# Patient Record
Sex: FEMALE | Race: WHITE | NOT HISPANIC OR LATINO | URBAN - METROPOLITAN AREA
[De-identification: names, ages, dates, MRNs, and addresses within clinical notes are randomized per-mention and may not be internally consistent; named-entity substitution may affect disease eponyms.]

---

## 2019-03-14 ENCOUNTER — OFFICE VISIT (OUTPATIENT)
Dept: INTERNAL MEDICINE | Age: 29
End: 2019-03-14

## 2019-03-14 VITALS
BODY MASS INDEX: 30.05 KG/M2 | DIASTOLIC BLOOD PRESSURE: 78 MMHG | HEIGHT: 66 IN | WEIGHT: 187 LBS | OXYGEN SATURATION: 98 % | HEART RATE: 104 BPM | TEMPERATURE: 98.7 F | SYSTOLIC BLOOD PRESSURE: 120 MMHG

## 2019-03-14 DIAGNOSIS — R49.0 HOARSENESS: ICD-10-CM

## 2019-03-14 DIAGNOSIS — Z30.011 ENCOUNTER FOR INITIAL PRESCRIPTION OF CONTRACEPTIVE PILLS: ICD-10-CM

## 2019-03-14 DIAGNOSIS — Z00.00 PREVENTATIVE HEALTH CARE: ICD-10-CM

## 2019-03-14 DIAGNOSIS — J35.1 ENLARGED TONSILS: ICD-10-CM

## 2019-03-14 DIAGNOSIS — N94.6 DYSMENORRHEA: Primary | ICD-10-CM

## 2019-03-14 DIAGNOSIS — Z00.00 ROUTINE ADULT HEALTH MAINTENANCE: ICD-10-CM

## 2019-03-14 DIAGNOSIS — Z76.89 ENCOUNTER TO ESTABLISH CARE: ICD-10-CM

## 2019-03-14 DIAGNOSIS — R06.83 SNORING: ICD-10-CM

## 2019-03-14 LAB — HCG UR QL: NEGATIVE

## 2019-03-14 PROCEDURE — 99203 OFFICE O/P NEW LOW 30 MIN: CPT | Performed by: NURSE PRACTITIONER

## 2019-03-14 RX ORDER — CLINDAMYCIN HYDROCHLORIDE 300 MG/1
CAPSULE ORAL
Refills: 0 | COMMUNITY
Start: 2019-03-10 | End: 2020-02-03

## 2019-03-14 RX ORDER — NORGESTIMATE AND ETHINYL ESTRADIOL 0.25-0.035
1 KIT ORAL DAILY
Qty: 28 TABLET | Refills: 11 | Status: SHIPPED | OUTPATIENT
Start: 2019-03-14 | End: 2020-02-02 | Stop reason: SDUPTHER

## 2019-03-14 NOTE — PROGRESS NOTES
[facility] [place] INTERNAL MEDICINE  LATRICIA Hadley Switzer / 29 y.o. / female  03/14/2019      ASSESSMENT & PLAN:    Problem List Items Addressed This Visit     None      Visit Diagnoses     Dysmenorrhea    -  Primary    Relevant Medications    norgestimate-ethinyl estradiol (SPRINTEC 28) 0.25-35 MG-MCG per tablet    Other Relevant Orders    Ambulatory Referral to Gynecology    CBC & Differential    TSH Rfx On Abnormal To Free T4    Pregnancy, Urine - Urine, Clean Catch    Encounter to establish care        Hoarseness        Relevant Orders    Ambulatory Referral to ENT (Otolaryngology)    Enlarged tonsils        Relevant Orders    Ambulatory Referral to ENT (Otolaryngology)    Snoring        Relevant Orders    Ambulatory Referral to ENT (Otolaryngology)    Preventative health care        Relevant Orders    Basic metabolic panel    CBC & Differential    TSH Rfx On Abnormal To Free T4    Vitamin D 25 Hydroxy    Routine adult health maintenance        Relevant Orders    Basic metabolic panel    CBC & Differential    TSH Rfx On Abnormal To Free T4    Vitamin D 25 Hydroxy    Encounter for initial prescription of contraceptive pills        Relevant Orders    Pregnancy, Urine - Urine, Clean Catch        Orders Placed This Encounter   Procedures   • Basic metabolic panel   • TSH Rfx On Abnormal To Free T4   • Vitamin D 25 Hydroxy   • Pregnancy, Urine - Urine, Clean Catch   • Ambulatory Referral to ENT (Otolaryngology)   • Ambulatory Referral to Gynecology   • CBC & Differential     New Medications Ordered This Visit   Medications   • norgestimate-ethinyl estradiol (SPRINTEC 28) 0.25-35 MG-MCG per tablet     Sig: Take 1 tablet by mouth Daily.     Dispense:  28 tablet     Refill:  11       Summary/Discussion:    1. Dysmenorrhea  Patient agreeable to restarting MERLINE at this time for treatment of dysmenorrhea. No contraindications noted, urine HCG to be completed by lab (out of POCT tests in office). We also briefly  "discussed other long term contraceptive options, including IUD and implant, patient not planning to have any more children in the near future. Recommended referral to GYN for regular GYN care and further discussion of other contraceptive methods if she chooses.     - norgestimate-ethinyl estradiol (SPRINTEC 28) 0.25-35 MG-MCG per tablet; Take 1 tablet by mouth Daily.  Dispense: 28 tablet; Refill: 11  - Ambulatory Referral to Gynecology  - CBC & Differential  - TSH Rfx On Abnormal To Free T4  - Pregnancy, Urine - Urine, Clean Catch    2. Encounter to establish care      3. Hoarseness  Patient with enlarged tonsils on exam, although may be residual from current strep treatment. Recommend referral to ENT for further evaluation of recurrent hoarseness, snoring, and enlarged tonsils.     - Ambulatory Referral to ENT (Otolaryngology)    4. Enlarged tonsils    - Ambulatory Referral to ENT (Otolaryngology)    5. Snoring    - Ambulatory Referral to ENT (Otolaryngology)    6. Preventative health care    - Basic metabolic panel  - CBC & Differential  - TSH Rfx On Abnormal To Free T4  - Vitamin D 25 Hydroxy    7. Routine adult health maintenance    - Basic metabolic panel  - CBC & Differential  - TSH Rfx On Abnormal To Free T4  - Vitamin D 25 Hydroxy    8. Encounter for initial prescription of contraceptive pills    - Pregnancy, Urine - Urine, Clean Catch      Return in about 1 year (around 3/14/2020) for Next scheduled follow up.    ____________________________________________________________________    VITALS:    Visit Vitals  /78   Pulse 104   Temp 98.7 °F (37.1 °C)   Ht 167.6 cm (66\")   Wt 84.8 kg (187 lb)   SpO2 98%   BMI 30.18 kg/m²       BP Readings from Last 3 Encounters:   03/14/19 120/78     Wt Readings from Last 3 Encounters:   03/14/19 84.8 kg (187 lb)      Body mass index is 30.18 kg/m².    CC: Main reason(s) for today's visit: Establish Care (new pt to establish care); Contraception (pt would like to discuss " "resuming birth control); and Sore Throat (pt has had scratchy throat x 2 months, w/ new onset snoring; pt was Rx'd Clindamycin)      HPI:    Patient is a 29 y.o. female who is here to establish care. Has not had previous PCP or GYN for about 6 years.       Dysmenorrhea/ Premenstrual Syndrome: Patient complains of menstrual symptoms. Symptoms began several years ago, reports improved with birth of son about 6 years ago but recently restarted in the past few months. Patient describes symptoms of  bloating/fluid retention, menstrual cramping with pain, nausea, increased PMS symptoms and irritability. Symptoms occur with periods, which are usually 28-30 days apart and quite regular. Patient denies decreased libido, depression, dyspareunia and menorrhagia. Evaluation to date includes none. Treatment to date includes Oral contraceptive pills per medication list:(effective). The patient is sexually active. LMP 2/20/19.   She would like to discuss restarting contraceptive therapy. No personal history of HTN, VTE, migraines, stroke.     She would also like to discuss concerns about \"scratchy voice all the time\", increased snoring, unexplained periods of episodic hoarseness. Occasional post nasal drainage. Denies any symptoms of GERD. No sore throat, dysphagia, URI symptoms, rhinitis, or congestion. Has history of recurrent ear infections, unknown history of recurrent strep, currently being treated for acute strep pharyngitis x 5 days. Patient is not a smoker, no secondhand smoke exposure, only occasional ETOH use.     Patient Care Team:  Sulema Washington APRN as PCP - General (Internal Medicine)  ____________________________________________________________________      REVIEW OF SYSTEMS    Review of Systems   Constitutional: Negative for activity change, appetite change, fever and unexpected weight change.   HENT: Positive for ear pain, sore throat and voice change. Negative for congestion, postnasal drip, sinus pressure " and trouble swallowing.    Respiratory: Negative for shortness of breath.    Cardiovascular: Negative for chest pain, palpitations and leg swelling.   Genitourinary: Positive for menstrual problem. Negative for dyspareunia, pelvic pain, vaginal bleeding, vaginal discharge and vaginal pain.       PHYSICAL EXAMINATION    Physical Exam   Constitutional: She is oriented to person, place, and time. Vital signs are normal. She appears well-developed and well-nourished. She is cooperative. She does not appear ill. No distress.   HENT:   Head: Normocephalic and atraumatic.   Right Ear: Hearing, external ear and ear canal normal. No drainage or swelling. Tympanic membrane is scarred. Tympanic membrane is not injected and not erythematous. A middle ear effusion is present.   Left Ear: Hearing, external ear and ear canal normal. No drainage or swelling. Tympanic membrane is scarred. Tympanic membrane is not injected and not erythematous. A middle ear effusion is present.   Nose: Nose normal.   Mouth/Throat: Uvula is midline, oropharynx is clear and moist and mucous membranes are normal. Tonsils are 3+ on the right. Tonsils are 3+ on the left. No tonsillar exudate.   Cardiovascular: Normal rate, regular rhythm, S1 normal, S2 normal and normal heart sounds.   No murmur heard.  Pulmonary/Chest: Effort normal and breath sounds normal. She has no decreased breath sounds. She has no wheezes. She has no rhonchi. She has no rales.   Lymphadenopathy:     She has cervical adenopathy.        Right cervical: Superficial cervical adenopathy present. No deep cervical and no posterior cervical adenopathy present.       Left cervical: Superficial cervical adenopathy present. No deep cervical and no posterior cervical adenopathy present.   Neurological: She is alert and oriented to person, place, and time.   Skin: Skin is warm, dry and intact.   Psychiatric: She has a normal mood and affect. Her speech is normal and behavior is normal.  Judgment and thought content normal. Cognition and memory are normal.   Nursing note and vitals reviewed.        REVIEWED DATA:    Labs:   No results found for: NA, K, AST, ALT, BUN, CREATININE, EGFRIFNONA, EGFRIFAFRI    No results found for: GLUCOSE, HGBA1C, MICROALBUR    No results found for: LDL, HDL, TRIG, CHOLHDLRATIO    No results found for: TSH, FREET4     No results found for: WBC, HGB, PLT      Imaging:        Medical Tests:        Summary of old records / correspondence / consultant report:        Request outside records:        ______________________________________________________________________    ALLERGIES  Allergies   Allergen Reactions   • Amoxicillin Rash   • Penicillins Rash        MEDICATIONS  No current outpatient medications on file prior to visit.     No current facility-administered medications on file prior to visit.        PFSH:     The following portions of the patient's history were reviewed and updated as appropriate: Allergies / Current Medications / Past Medical History / Surgical History / Social History / Family History    PROBLEM LIST   There is no problem list on file for this patient.      PAST MEDICAL HISTORY  Past Medical History:   Diagnosis Date   • Dysmenorrhea        SURGICAL HISTORY  Past Surgical History:   Procedure Laterality Date   • EAR TUBES         SOCIAL HISTORY  Social History     Socioeconomic History   • Marital status:      Spouse name: Not on file   • Number of children: 1   • Years of education: Not on file   • Highest education level: Not on file   Occupational History   • Occupation:  worker   Tobacco Use   • Smoking status: Never Smoker   • Smokeless tobacco: Never Used   Substance and Sexual Activity   • Alcohol use: Yes     Comment: occasionally   • Sexual activity: Yes   Social History Narrative    1 child 6 years old        FAMILY HISTORY  Family History   Problem Relation Age of Onset   • No Known Problems Mother    • Heart disease Father     • Heart attack Father    • Heart disease Maternal Grandfather    • Cancer Maternal Grandfather    • Pancreatic cancer Maternal Grandfather          **Avni Disclaimer:   Much of this encounter note is an electronic transcription/translation of spoken language to printed text. The electronic translation of spoken language may permit erroneous, or at times, nonsensical words or phrases to be inadvertently transcribed. Although I have reviewed the note for such errors, some may still exist.

## 2019-03-15 DIAGNOSIS — E55.9 VITAMIN D DEFICIENCY: Primary | ICD-10-CM

## 2019-03-15 LAB
25(OH)D3+25(OH)D2 SERPL-MCNC: 18.5 NG/ML (ref 30–100)
BASOPHILS # BLD AUTO: 0.03 10*3/MM3 (ref 0–0.2)
BASOPHILS NFR BLD AUTO: 0.4 % (ref 0–1.5)
BUN SERPL-MCNC: 7 MG/DL (ref 6–20)
BUN/CREAT SERPL: 11.3 (ref 7–25)
CALCIUM SERPL-MCNC: 9.5 MG/DL (ref 8.6–10.5)
CHLORIDE SERPL-SCNC: 100 MMOL/L (ref 98–107)
CO2 SERPL-SCNC: 25.5 MMOL/L (ref 22–29)
CREAT SERPL-MCNC: 0.62 MG/DL (ref 0.57–1)
EOSINOPHIL # BLD AUTO: 0.03 10*3/MM3 (ref 0–0.4)
EOSINOPHIL NFR BLD AUTO: 0.4 % (ref 0.3–6.2)
ERYTHROCYTE [DISTWIDTH] IN BLOOD BY AUTOMATED COUNT: 13.4 % (ref 12.3–15.4)
GLUCOSE SERPL-MCNC: 86 MG/DL (ref 65–99)
HCT VFR BLD AUTO: 43.2 % (ref 34–46.6)
HGB BLD-MCNC: 13.9 G/DL (ref 12–15.9)
IMM GRANULOCYTES # BLD AUTO: 0.02 10*3/MM3 (ref 0–0.05)
IMM GRANULOCYTES NFR BLD AUTO: 0.3 % (ref 0–0.5)
LYMPHOCYTES # BLD AUTO: 2.58 10*3/MM3 (ref 0.7–3.1)
LYMPHOCYTES NFR BLD AUTO: 33.9 % (ref 19.6–45.3)
MCH RBC QN AUTO: 27.5 PG (ref 26.6–33)
MCHC RBC AUTO-ENTMCNC: 32.2 G/DL (ref 31.5–35.7)
MCV RBC AUTO: 85.5 FL (ref 79–97)
MONOCYTES # BLD AUTO: 0.67 10*3/MM3 (ref 0.1–0.9)
MONOCYTES NFR BLD AUTO: 8.8 % (ref 5–12)
NEUTROPHILS # BLD AUTO: 4.28 10*3/MM3 (ref 1.4–7)
NEUTROPHILS NFR BLD AUTO: 56.2 % (ref 42.7–76)
NRBC BLD AUTO-RTO: 0.1 /100 WBC (ref 0–0)
PLATELET # BLD AUTO: 300 10*3/MM3 (ref 140–450)
POTASSIUM SERPL-SCNC: 3.9 MMOL/L (ref 3.5–5.2)
RBC # BLD AUTO: 5.05 10*6/MM3 (ref 3.77–5.28)
SODIUM SERPL-SCNC: 138 MMOL/L (ref 136–145)
TSH SERPL DL<=0.005 MIU/L-ACNC: 2.35 MIU/ML (ref 0.27–4.2)
WBC # BLD AUTO: 7.61 10*3/MM3 (ref 3.4–10.8)

## 2019-08-14 ENCOUNTER — OFFICE VISIT (OUTPATIENT)
Dept: INTERNAL MEDICINE | Age: 29
End: 2019-08-14

## 2019-08-14 VITALS
TEMPERATURE: 99.3 F | BODY MASS INDEX: 31.53 KG/M2 | OXYGEN SATURATION: 99 % | HEIGHT: 66 IN | WEIGHT: 196.2 LBS | HEART RATE: 79 BPM

## 2019-08-14 DIAGNOSIS — J02.9 PHARYNGITIS, UNSPECIFIED ETIOLOGY: Primary | ICD-10-CM

## 2019-08-14 PROCEDURE — 99213 OFFICE O/P EST LOW 20 MIN: CPT | Performed by: INTERNAL MEDICINE

## 2019-08-14 RX ORDER — AZITHROMYCIN 250 MG/1
TABLET, FILM COATED ORAL
Qty: 6 TABLET | Refills: 0 | Status: SHIPPED | OUTPATIENT
Start: 2019-08-14 | End: 2020-02-03

## 2019-08-14 NOTE — PROGRESS NOTES
Leonie Gomez is a 29 y.o. female who presents with   Chief Complaint   Patient presents with   • Sore Throat     4 days; temperatures of around 100.  No drainage, cough or exudates   .    29-year-old female.  Patient of nurse practitioner Felix.  Presents with a 4-day history of right ear pain, sore throat and swollen glands in the right side of her neck.  She has had a temperature of around 100.  She works in a  center.  She has not knowingly been around anyone with strep or mono she says.      Sore Throat    This is a new problem. The current episode started in the past 7 days. The problem has been unchanged. The pain is worse on the right side. The maximum temperature recorded prior to her arrival was 100.4 - 100.9 F. The pain is mild. Associated symptoms include ear pain and swollen glands. She has had no exposure to strep or mono. She has tried nothing for the symptoms.        The following portions of the patient's history were reviewed and updated as appropriate: allergies, current medications, past medical history and problem list.    Review of Systems   Constitutional: Negative.    HENT: Positive for ear pain and sore throat.    Respiratory: Negative.    Cardiovascular: Negative.    Neurological: Negative.    Psychiatric/Behavioral: Negative.        Objective   Physical Exam   Constitutional: She is oriented to person, place, and time. She appears well-developed and well-nourished.   HENT:   Head: Normocephalic and atraumatic.   Both tympanic membranes appear reddened.  Oropharynx reddened posteriorly but no exudate seen.  Tender right submandibular adenopathy is noted.  Lungs are clear.   Eyes: EOM are normal. Pupils are equal, round, and reactive to light.   Cardiovascular: Normal rate.   Pulmonary/Chest: Effort normal.   Neurological: She is alert and oriented to person, place, and time.   Psychiatric: She has a normal mood and affect. Her behavior is normal. Judgment and thought content  normal.   Nursing note and vitals reviewed.      Assessment/Plan   Leonie was seen today for sore throat.    Diagnoses and all orders for this visit:    Pharyngitis, unspecified etiology    Other orders  -     azithromycin (ZITHROMAX Z-ISABEL) 250 MG tablet; Take 2 tablets the first day, then 1 tablet daily for 4 days.        Plan: Z-Isabel as directed.  Follow-up as needed.  Symptomatic treatment with Tylenol or Advil for pain or temperature.  Saline gargles, Chloraseptic Spray etc.

## 2020-02-02 DIAGNOSIS — N94.6 DYSMENORRHEA: ICD-10-CM

## 2020-02-03 RX ORDER — NORGESTIMATE AND ETHINYL ESTRADIOL 0.25-0.035
1 KIT ORAL DAILY
Qty: 28 TABLET | Refills: 11 | Status: SHIPPED | OUTPATIENT
Start: 2020-02-03 | End: 2021-01-08 | Stop reason: SDUPTHER

## 2020-10-16 ENCOUNTER — OFFICE VISIT (OUTPATIENT)
Dept: INTERNAL MEDICINE | Age: 30
End: 2020-10-16

## 2020-10-16 VITALS
OXYGEN SATURATION: 99 % | WEIGHT: 157.4 LBS | RESPIRATION RATE: 13 BRPM | HEIGHT: 66 IN | HEART RATE: 81 BPM | BODY MASS INDEX: 25.3 KG/M2 | SYSTOLIC BLOOD PRESSURE: 110 MMHG | DIASTOLIC BLOOD PRESSURE: 70 MMHG | TEMPERATURE: 97.6 F

## 2020-10-16 DIAGNOSIS — R56.9 SEIZURE (HCC): Primary | ICD-10-CM

## 2020-10-16 PROCEDURE — 99214 OFFICE O/P EST MOD 30 MIN: CPT | Performed by: INTERNAL MEDICINE

## 2020-10-16 RX ORDER — LANOLIN ALCOHOL/MO/W.PET/CERES
400 CREAM (GRAM) TOPICAL DAILY
COMMUNITY
End: 2023-02-09

## 2020-10-16 RX ORDER — LEVETIRACETAM 500 MG/1
500 TABLET ORAL 2 TIMES DAILY
COMMUNITY
End: 2021-06-15
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
## 2021-01-08 DIAGNOSIS — N94.6 DYSMENORRHEA: ICD-10-CM

## 2021-01-08 RX ORDER — NORGESTIMATE AND ETHINYL ESTRADIOL 0.25-0.035
1 KIT ORAL DAILY
Qty: 28 TABLET | Refills: 11 | Status: SHIPPED | OUTPATIENT
Start: 2021-01-08 | End: 2021-12-17

## 2021-02-01 DIAGNOSIS — Z00.00 PREVENTATIVE HEALTH CARE: Primary | ICD-10-CM

## 2021-02-01 DIAGNOSIS — Z11.59 NEED FOR HEPATITIS C SCREENING TEST: ICD-10-CM

## 2021-02-01 DIAGNOSIS — E55.9 VITAMIN D DEFICIENCY: ICD-10-CM

## 2021-02-06 LAB
25(OH)D3+25(OH)D2 SERPL-MCNC: 49.2 NG/ML (ref 30–100)
ALBUMIN SERPL-MCNC: 4.1 G/DL (ref 3.5–5.2)
ALBUMIN/GLOB SERPL: 1.4 G/DL
ALP SERPL-CCNC: 54 U/L (ref 39–117)
ALT SERPL-CCNC: 13 U/L (ref 1–33)
APPEARANCE UR: CLEAR
AST SERPL-CCNC: 20 U/L (ref 1–32)
BASOPHILS # BLD AUTO: 0.04 10*3/MM3 (ref 0–0.2)
BASOPHILS NFR BLD AUTO: 0.7 % (ref 0–1.5)
BILIRUB SERPL-MCNC: 0.4 MG/DL (ref 0–1.2)
BILIRUB UR QL STRIP: NEGATIVE
BUN SERPL-MCNC: 8 MG/DL (ref 6–20)
BUN/CREAT SERPL: 9.9 (ref 7–25)
CALCIUM SERPL-MCNC: 9.1 MG/DL (ref 8.6–10.5)
CHLORIDE SERPL-SCNC: 104 MMOL/L (ref 98–107)
CHOLEST SERPL-MCNC: 226 MG/DL (ref 0–200)
CHOLEST/HDLC SERPL: 3.83 {RATIO}
CO2 SERPL-SCNC: 22.4 MMOL/L (ref 22–29)
COLOR UR: YELLOW
CREAT SERPL-MCNC: 0.81 MG/DL (ref 0.57–1)
EOSINOPHIL # BLD AUTO: 0.07 10*3/MM3 (ref 0–0.4)
EOSINOPHIL NFR BLD AUTO: 1.2 % (ref 0.3–6.2)
ERYTHROCYTE [DISTWIDTH] IN BLOOD BY AUTOMATED COUNT: 12 % (ref 12.3–15.4)
GLOBULIN SER CALC-MCNC: 2.9 GM/DL
GLUCOSE SERPL-MCNC: 87 MG/DL (ref 65–99)
GLUCOSE UR QL: NEGATIVE
HCT VFR BLD AUTO: 39.3 % (ref 34–46.6)
HCV AB S/CO SERPL IA: <0.1 S/CO RATIO (ref 0–0.9)
HDLC SERPL-MCNC: 59 MG/DL (ref 40–60)
HGB BLD-MCNC: 13 G/DL (ref 12–15.9)
HGB UR QL STRIP: NEGATIVE
IMM GRANULOCYTES # BLD AUTO: 0.02 10*3/MM3 (ref 0–0.05)
IMM GRANULOCYTES NFR BLD AUTO: 0.3 % (ref 0–0.5)
KETONES UR QL STRIP: NEGATIVE
LDLC SERPL CALC-MCNC: 130 MG/DL (ref 0–100)
LEUKOCYTE ESTERASE UR QL STRIP: NEGATIVE
LYMPHOCYTES # BLD AUTO: 2.42 10*3/MM3 (ref 0.7–3.1)
LYMPHOCYTES NFR BLD AUTO: 40.7 % (ref 19.6–45.3)
MCH RBC QN AUTO: 28 PG (ref 26.6–33)
MCHC RBC AUTO-ENTMCNC: 33.1 G/DL (ref 31.5–35.7)
MCV RBC AUTO: 84.5 FL (ref 79–97)
MONOCYTES # BLD AUTO: 0.52 10*3/MM3 (ref 0.1–0.9)
MONOCYTES NFR BLD AUTO: 8.8 % (ref 5–12)
NEUTROPHILS # BLD AUTO: 2.87 10*3/MM3 (ref 1.7–7)
NEUTROPHILS NFR BLD AUTO: 48.3 % (ref 42.7–76)
NITRITE UR QL STRIP: NEGATIVE
NRBC BLD AUTO-RTO: 0 /100 WBC (ref 0–0.2)
PH UR STRIP: 5.5 [PH] (ref 5–8)
PLATELET # BLD AUTO: 310 10*3/MM3 (ref 140–450)
POTASSIUM SERPL-SCNC: 4.8 MMOL/L (ref 3.5–5.2)
PROT SERPL-MCNC: 7 G/DL (ref 6–8.5)
PROT UR QL STRIP: NEGATIVE
RBC # BLD AUTO: 4.65 10*6/MM3 (ref 3.77–5.28)
SODIUM SERPL-SCNC: 140 MMOL/L (ref 136–145)
SP GR UR: 1.02 (ref 1–1.03)
TRIGL SERPL-MCNC: 213 MG/DL (ref 0–150)
TSH SERPL DL<=0.005 MIU/L-ACNC: 4.05 UIU/ML (ref 0.27–4.2)
UROBILINOGEN UR STRIP-MCNC: NORMAL MG/DL
VLDLC SERPL CALC-MCNC: 37 MG/DL (ref 5–40)
WBC # BLD AUTO: 5.94 10*3/MM3 (ref 3.4–10.8)

## 2021-04-16 ENCOUNTER — BULK ORDERING (OUTPATIENT)
Dept: CASE MANAGEMENT | Facility: OTHER | Age: 31
End: 2021-04-16

## 2021-04-16 DIAGNOSIS — Z23 IMMUNIZATION DUE: ICD-10-CM

## 2021-05-04 ENCOUNTER — OFFICE VISIT (OUTPATIENT)
Dept: INTERNAL MEDICINE | Age: 31
End: 2021-05-04

## 2021-05-04 VITALS
OXYGEN SATURATION: 100 % | SYSTOLIC BLOOD PRESSURE: 118 MMHG | TEMPERATURE: 98.4 F | HEIGHT: 66 IN | DIASTOLIC BLOOD PRESSURE: 76 MMHG | BODY MASS INDEX: 26.68 KG/M2 | HEART RATE: 90 BPM | WEIGHT: 166 LBS

## 2021-05-04 DIAGNOSIS — R56.9 SEIZURE (HCC): ICD-10-CM

## 2021-05-04 DIAGNOSIS — F41.9 ANXIETY AND DEPRESSION: ICD-10-CM

## 2021-05-04 DIAGNOSIS — Z00.00 ANNUAL PHYSICAL EXAM: Primary | ICD-10-CM

## 2021-05-04 DIAGNOSIS — F32.A ANXIETY AND DEPRESSION: ICD-10-CM

## 2021-05-04 DIAGNOSIS — Z00.00 ROUTINE HEALTH MAINTENANCE: ICD-10-CM

## 2021-05-04 PROCEDURE — 99214 OFFICE O/P EST MOD 30 MIN: CPT | Performed by: NURSE PRACTITIONER

## 2021-05-04 PROCEDURE — 99395 PREV VISIT EST AGE 18-39: CPT | Performed by: NURSE PRACTITIONER

## 2021-05-04 NOTE — PATIENT INSTRUCTIONS
Health Maintenance, Female  Adopting a healthy lifestyle and getting preventive care are important in promoting health and wellness. Ask your health care provider about:  · The right schedule for you to have regular tests and exams.  · Things you can do on your own to prevent diseases and keep yourself healthy.  What should I know about diet, weight, and exercise?  Eat a healthy diet    · Eat a diet that includes plenty of vegetables, fruits, low-fat dairy products, and lean protein.  · Do not eat a lot of foods that are high in solid fats, added sugars, or sodium.  Maintain a healthy weight  Body mass index (BMI) is used to identify weight problems. It estimates body fat based on height and weight. Your health care provider can help determine your BMI and help you achieve or maintain a healthy weight.  Get regular exercise  Get regular exercise. This is one of the most important things you can do for your health. Most adults should:  · Exercise for at least 150 minutes each week. The exercise should increase your heart rate and make you sweat (moderate-intensity exercise).  · Do strengthening exercises at least twice a week. This is in addition to the moderate-intensity exercise.  · Spend less time sitting. Even light physical activity can be beneficial.  Watch cholesterol and blood lipids  Have your blood tested for lipids and cholesterol at 20 years of age, then have this test every 5 years.  Have your cholesterol levels checked more often if:  · Your lipid or cholesterol levels are high.  · You are older than 40 years of age.  · You are at high risk for heart disease.  What should I know about cancer screening?  Depending on your health history and family history, you may need to have cancer screening at various ages. This may include screening for:  · Breast cancer.  · Cervical cancer.  · Colorectal cancer.  · Skin cancer.  · Lung cancer.  What should I know about heart disease, diabetes, and high blood  pressure?  Blood pressure and heart disease  · High blood pressure causes heart disease and increases the risk of stroke. This is more likely to develop in people who have high blood pressure readings, are of  descent, or are overweight.  · Have your blood pressure checked:  ? Every 3-5 years if you are 18-39 years of age.  ? Every year if you are 40 years old or older.  Diabetes  Have regular diabetes screenings. This checks your fasting blood sugar level. Have the screening done:  · Once every three years after age 40 if you are at a normal weight and have a low risk for diabetes.  · More often and at a younger age if you are overweight or have a high risk for diabetes.  What should I know about preventing infection?  Hepatitis B  If you have a higher risk for hepatitis B, you should be screened for this virus. Talk with your health care provider to find out if you are at risk for hepatitis B infection.  Hepatitis C  Testing is recommended for:  · Everyone born from 1945 through 1965.  · Anyone with known risk factors for hepatitis C.  Sexually transmitted infections (STIs)  · Get screened for STIs, including gonorrhea and chlamydia, if:  ? You are sexually active and are younger than 24 years of age.  ? You are older than 24 years of age and your health care provider tells you that you are at risk for this type of infection.  ? Your sexual activity has changed since you were last screened, and you are at increased risk for chlamydia or gonorrhea. Ask your health care provider if you are at risk.  · Ask your health care provider about whether you are at high risk for HIV. Your health care provider may recommend a prescription medicine to help prevent HIV infection. If you choose to take medicine to prevent HIV, you should first get tested for HIV. You should then be tested every 3 months for as long as you are taking the medicine.  Pregnancy  · If you are about to stop having your period (premenopausal) and  you may become pregnant, seek counseling before you get pregnant.  · Take 400 to 800 micrograms (mcg) of folic acid every day if you become pregnant.  · Ask for birth control (contraception) if you want to prevent pregnancy.  Osteoporosis and menopause  Osteoporosis is a disease in which the bones lose minerals and strength with aging. This can result in bone fractures. If you are 65 years old or older, or if you are at risk for osteoporosis and fractures, ask your health care provider if you should:  · Be screened for bone loss.  · Take a calcium or vitamin D supplement to lower your risk of fractures.  · Be given hormone replacement therapy (HRT) to treat symptoms of menopause.  Follow these instructions at home:  Lifestyle  · Do not use any products that contain nicotine or tobacco, such as cigarettes, e-cigarettes, and chewing tobacco. If you need help quitting, ask your health care provider.  · Do not use street drugs.  · Do not share needles.  · Ask your health care provider for help if you need support or information about quitting drugs.  Alcohol use  · Do not drink alcohol if:  ? Your health care provider tells you not to drink.  ? You are pregnant, may be pregnant, or are planning to become pregnant.  · If you drink alcohol:  ? Limit how much you use to 0-1 drink a day.  ? Limit intake if you are breastfeeding.  · Be aware of how much alcohol is in your drink. In the [place] one drink equals one 12 oz bottle of beer (355 mL), one 5 oz glass of wine (148 mL), or one 1½ oz glass of hard liquor (44 mL).  General instructions  · Schedule regular health, dental, and eye exams.  · Stay current with your vaccines.  · Tell your health care provider if:  ? You often feel depressed.  ? You have ever been abused or do not feel safe at home.  Sertraline tablets  What is this medicine?  SERTRALINE (SER tra rossana) is used to treat depression. It may also be used to treat obsessive compulsive disorder, panic disorder,  post-trauma stress, premenstrual dysphoric disorder (PMDD) or social anxiety.  This medicine may be used for other purposes; ask your health care provider or pharmacist if you have questions.  COMMON BRAND NAME(S): Zoloft  What should I tell my health care provider before I take this medicine?  They need to know if you have any of these conditions:  · bleeding disorders  · bipolar disorder or a family history of bipolar disorder  · glaucoma  · heart disease  · high blood pressure  · history of irregular heartbeat  · history of low levels of calcium, magnesium, or potassium in the blood  · if you often drink alcohol  · liver disease  · receiving electroconvulsive therapy  · seizures  · suicidal thoughts, plans, or attempt; a previous suicide attempt by you or a family member  · take medicines that treat or prevent blood clots  · thyroid disease  · an unusual or allergic reaction to sertraline, other medicines, foods, dyes, or preservatives  · pregnant or trying to get pregnant  · breast-feeding  How should I use this medicine?  Take this medicine by mouth with a glass of water. Follow the directions on the prescription label. You can take it with or without food. Take your medicine at regular intervals. Do not take your medicine more often than directed. Do not stop taking this medicine suddenly except upon the advice of your doctor. Stopping this medicine too quickly may cause serious side effects or your condition may worsen.  A special MedGuide will be given to you by the pharmacist with each prescription and refill. Be sure to read this information carefully each time.  Talk to your pediatrician regarding the use of this medicine in children. While this drug may be prescribed for children as young as 7 years for selected conditions, precautions do apply.  Overdosage: If you think you have taken too much of this medicine contact a poison control center or emergency room at once.  NOTE: This medicine is only for  you. Do not share this medicine with others.  What if I miss a dose?  If you miss a dose, take it as soon as you can. If it is almost time for your next dose, take only that dose. Do not take double or extra doses.  What may interact with this medicine?  Do not take this medicine with any of the following medications:  · cisapride  · dronedarone  · linezolid  · MAOIs like Carbex, Eldepryl, Marplan, Nardil, and Parnate  · methylene blue (injected into a vein)  · pimozide  · thioridazine  This medicine may also interact with the following medications:  · alcohol  · amphetamines  · aspirin and aspirin-like medicines  · certain medicines for depression, anxiety, or psychotic disturbances  · certain medicines for fungal infections like ketoconazole, fluconazole, posaconazole, and itraconazole  · certain medicines for irregular heart beat like flecainide, quinidine, propafenone  · certain medicines for migraine headaches like almotriptan, eletriptan, frovatriptan, naratriptan, rizatriptan, sumatriptan, zolmitriptan  · certain medicines for sleep  · certain medicines for seizures like carbamazepine, valproic acid, phenytoin  · certain medicines that treat or prevent blood clots like warfarin, enoxaparin, dalteparin  · cimetidine  · digoxin  · diuretics  · fentanyl  · isoniazid  · lithium  · NSAIDs, medicines for pain and inflammation, like ibuprofen or naproxen  · other medicines that prolong the QT interval (cause an abnormal heart rhythm) like dofetilide  · rasagiline  · safinamide  · supplements like Pooja's wort, kava kava, valerian  · tolbutamide  · tramadol  · tryptophan  This list may not describe all possible interactions. Give your health care provider a list of all the medicines, herbs, non-prescription drugs, or dietary supplements you use. Also tell them if you smoke, drink alcohol, or use illegal drugs. Some items may interact with your medicine.  What should I watch for while using this medicine?  Tell  your doctor if your symptoms do not get better or if they get worse. Visit your doctor or health care professional for regular checks on your progress. Because it may take several weeks to see the full effects of this medicine, it is important to continue your treatment as prescribed by your doctor.  Patients and their families should watch out for new or worsening thoughts of suicide or depression. Also watch out for sudden changes in feelings such as feeling anxious, agitated, panicky, irritable, hostile, aggressive, impulsive, severely restless, overly excited and hyperactive, or not being able to sleep. If this happens, especially at the beginning of treatment or after a change in dose, call your health care professional.  You may get drowsy or dizzy. Do not drive, use machinery, or do anything that needs mental alertness until you know how this medicine affects you. Do not stand or sit up quickly, especially if you are an older patient. This reduces the risk of dizzy or fainting spells. Alcohol may interfere with the effect of this medicine. Avoid alcoholic drinks.  Your mouth may get dry. Chewing sugarless gum or sucking hard candy, and drinking plenty of water may help. Contact your doctor if the problem does not go away or is severe.  What side effects may I notice from receiving this medicine?  Side effects that you should report to your doctor or health care professional as soon as possible:  · allergic reactions like skin rash, itching or hives, swelling of the face, lips, or tongue  · anxious  · black, tarry stools  · changes in vision  · confusion  · elevated mood, decreased need for sleep, racing thoughts, impulsive behavior  · eye pain  · fast, irregular heartbeat  · feeling faint or lightheaded, falls  · feeling agitated, angry, or irritable  · hallucination, loss of contact with reality  · loss of balance or coordination  · loss of memory  · painful or prolonged erections  · restlessness, pacing,  inability to keep still  · seizures  · stiff muscles  · suicidal thoughts or other mood changes  · trouble sleeping  · unusual bleeding or bruising  · unusually weak or tired  · vomiting  Side effects that usually do not require medical attention (report to your doctor or health care professional if they continue or are bothersome):  · change in appetite or weight  · change in sex drive or performance  · diarrhea  · increased sweating  · indigestion, nausea  · tremors  This list may not describe all possible side effects. Call your doctor for medical advice about side effects. You may report side effects to FDA at 3-963-FCI-6386.  Where should I keep my medicine?  Keep out of the reach of children.  Store at room temperature between 15 and 30 degrees C (59 and 86 degrees F). Throw away any unused medicine after the expiration date.  NOTE: This sheet is a summary. It may not cover all possible information. If you have questions about this medicine, talk to your doctor, pharmacist, or health care provider.  © 2021 [employer] (2019-12-10 10:09:27)  Summary  · Adopting a healthy lifestyle and getting preventive care are important in promoting health and wellness.  · Follow your health care provider's instructions about healthy diet, exercising, and getting tested or screened for diseases.  · Follow your health care provider's instructions on monitoring your cholesterol and blood pressure.  This information is not intended to replace advice given to you by your health care provider. Make sure you discuss any questions you have with your health care provider.  Document Revised: 12/11/2019 Document Reviewed: 12/11/2019  [employer] Patient Education © 2021 [employer]
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
## 2021-06-15 ENCOUNTER — OFFICE VISIT (OUTPATIENT)
Dept: INTERNAL MEDICINE | Age: 31
End: 2021-06-15

## 2021-06-15 VITALS
WEIGHT: 165 LBS | TEMPERATURE: 97.7 F | OXYGEN SATURATION: 99 % | SYSTOLIC BLOOD PRESSURE: 122 MMHG | BODY MASS INDEX: 26.52 KG/M2 | HEART RATE: 89 BPM | DIASTOLIC BLOOD PRESSURE: 74 MMHG | HEIGHT: 66 IN

## 2021-06-15 DIAGNOSIS — F32.A ANXIETY AND DEPRESSION: ICD-10-CM

## 2021-06-15 DIAGNOSIS — F41.9 ANXIETY AND DEPRESSION: ICD-10-CM

## 2021-06-15 PROCEDURE — 99213 OFFICE O/P EST LOW 20 MIN: CPT | Performed by: NURSE PRACTITIONER

## 2021-06-15 NOTE — PROGRESS NOTES
"Chief Complaint  Anxiety    Subjective          Leonie Gomez presents to [facility]  Anxiety  Presents for follow-up visit. Patient reports no compulsions, depressed mood, excessive worry, nervous/anxious behavior or panic. Primary symptoms comment: Reports significant improvement in situational induced anxiety, reports no longer having panic attacks when faced with triggers (ie. driving downtown, MD appts, etc). . The quality of sleep is good.     Compliance with medications is %. Treatment side effects: Denies any adverse SE.       Objective   Vital Signs:   /74   Pulse 89   Temp 97.7 °F (36.5 °C) (Temporal)   Ht 167.6 cm (65.98\")   Wt 74.8 kg (165 lb)   SpO2 99%   BMI 26.64 kg/m²     Physical Exam  Vitals and nursing note reviewed.   Constitutional:       Appearance: She is well-developed.   Neurological:      Mental Status: She is alert and oriented to person, place, and time. She is not disoriented.   Psychiatric:         Attention and Perception: She is attentive.         Speech: Speech normal.         Behavior: Behavior normal. Behavior is cooperative.         Thought Content: Thought content normal.         Judgment: Judgment normal.        Result Review :   The following data was reviewed by: ALONA Lira on 06/15/2021:  Common labs    Common Labsle 2/5/21 2/5/21 2/5/21    0800 0800 0800   Glucose  87    BUN  8    Creatinine  0.81    eGFR Non  Am  82    eGFR African Am  100    Sodium  140    Potassium  4.8    Chloride  104    Calcium  9.1    Total Protein  7.0    Albumin  4.10    Total Bilirubin  0.4    Alkaline Phosphatase  54    AST (SGOT)  20    ALT (SGPT)  13    WBC 5.94     Hemoglobin 13.0     Hematocrit 39.3     Platelets 310     Total Cholesterol   226 (A)   Triglycerides   213 (A)   HDL Cholesterol   59   LDL Cholesterol    130 (A)   (A) Abnormal value       Comments are available for some flowsheets but are not being displayed. "                  Assessment and Plan    Diagnoses and all orders for this visit:    1. Anxiety and depression  Patient seems to have significantly improved symptoms on current dose.   Continue same, may adjust dosage as needed.   Follow up 6 months for re-evaluation, sooner as needed.     -     sertraline (Zoloft) 50 MG tablet; Take 1 tablet by mouth Daily.  Dispense: 90 tablet; Refill: 1        Follow Up   Return in about 6 months (around 12/15/2021) for Next scheduled follow up.  Patient was given instructions and counseling regarding her condition or for health maintenance advice. Please see specific information pulled into the AVS if appropriate.

## 2021-06-22 ENCOUNTER — TELEPHONE (OUTPATIENT)
Dept: INTERNAL MEDICINE | Age: 31
End: 2021-06-22

## 2021-06-22 NOTE — TELEPHONE ENCOUNTER
Contact patient.     Unfortunately, all SSRIs have the potential to cause nosebleeds. If the nosebleeds are severe, she will need to wean herself off the sertraline.     Recommend can wean herself off of sertraline.   Take every other day for 1 week, then every third day for 1 week, then stop.     Have her follow up with me in 3-4 weeks or sooner if needed.

## 2021-06-22 NOTE — TELEPHONE ENCOUNTER
Caller: Leonie Gomez    Relationship: Self    Best call back number: [phone]    What medications are you currently taking:   Current Outpatient Medications on File Prior to Visit   Medication Sig Dispense Refill   • folic acid (FOLVITE) 400 MCG tablet Take 400 mcg by mouth Daily.     • Multiple Vitamins-Minerals (Hair Skin and Nails Formula) tablet Take 1 tablet by mouth.     • Multiple Vitamins-Minerals (MULTIVITAMIN ADULT PO) Take  by mouth.     • norgestimate-ethinyl estradiol (Sprintec 28) 0.25-35 MG-MCG per tablet Take 1 tablet by mouth Daily. 28 tablet 11   • sertraline (Zoloft) 50 MG tablet Take 1 tablet by mouth Daily. 90 tablet 1     No current facility-administered medications on file prior to visit.        When did you start taking these medications: 7-8 WEEKS AGO    Which medication are you concerned about: sertraline (Zoloft) 50 MG tablet    Who prescribed you this medication: ALONA LEMON    What are your concerns: PATIENT HAS BEEN HAVING NOSE BLEEDS DAILY SINCE Sunday. PATIENT STATED ONE OF THE SIDE EFFECTS OF ZOLOFT IS NOSE BLEED. PATIENT IS ALSO ON VACATION.    How long have you been taking these medications: 7-8 WEEKS AGO    How long have you had these concerns: SINCE SUNDAY

## 2021-07-13 ENCOUNTER — OFFICE VISIT (OUTPATIENT)
Dept: INTERNAL MEDICINE | Age: 31
End: 2021-07-13

## 2021-07-13 VITALS
OXYGEN SATURATION: 99 % | SYSTOLIC BLOOD PRESSURE: 118 MMHG | BODY MASS INDEX: 28.12 KG/M2 | TEMPERATURE: 97.5 F | DIASTOLIC BLOOD PRESSURE: 76 MMHG | HEART RATE: 100 BPM | HEIGHT: 66 IN | WEIGHT: 175 LBS

## 2021-07-13 DIAGNOSIS — F32.A ANXIETY AND DEPRESSION: ICD-10-CM

## 2021-07-13 DIAGNOSIS — F41.9 ANXIETY AND DEPRESSION: ICD-10-CM

## 2021-07-13 DIAGNOSIS — R04.0 EPISTAXIS: Primary | ICD-10-CM

## 2021-07-13 PROCEDURE — 99214 OFFICE O/P EST MOD 30 MIN: CPT | Performed by: NURSE PRACTITIONER

## 2021-07-13 NOTE — PATIENT INSTRUCTIONS
Nosebleed, Adult    A nosebleed is when blood comes out of the nose. Nosebleeds are common. Usually, they are not a sign of a serious condition.  Nosebleeds can happen if a blood vessel in your nose starts to bleed or if the lining of your nose (mucous membrane) cracks. They are commonly caused by:  · Allergies.  · Colds.  · Picking your nose.  · Blowing your nose too hard.  · An injury from sticking an object into your nose or getting hit in the nose.  · Dry or cold air.  Less common causes of nosebleeds include:  · Toxic fumes.  · Something abnormal in the nose or in the air-filled spaces in the bones of the face (sinuses).  · Growths in the nose, such as polyps.  · Blood thinners or conditions that cause blood to clot slowly.  · Certain illnesses or procedures that irritate or dry out the nasal passages.  Follow these instructions at home:  When you have a nosebleed:    · Sit down and tilt your head slightly forward.  · Use a clean towel or tissue to pinch your nostrils under the bony part of your nose. After 5 minutes, let go of your nose and see if bleeding starts again. Do not release pressure before that time. If there is still bleeding, repeat the pinching and holding for 5 minutes or until the bleeding stops.  · Do not place tissues or gauze in the nose to stop the bleeding.  · Avoid lying down and avoid tilting your head backward. That may make blood collect in the throat and cause gagging or coughing.  · Use a nasal spray decongestant to help with a nosebleed as told by your health care provider.  After a nosebleed:  · Avoid blowing your nose or sniffing for a number of hours.  · Avoid straining, lifting, or bending at the waist for several days. You may go back to other normal activities as you are able.  · If you are taking aspirin or blood thinners and you have nosebleeds, talk to your health care provider. These medicines make bleeding more likely.  ? Ask your health care provider if you should stop  taking the medicines or if you should adjust the dose.  ? Do not stop taking medicines that your health care provider has recommended unless he or she tells you to stop taking them.  · If your nosebleed was caused by dry mucous membranes, use over-the-counter saline nasal spray or gel and a humidifier as told by your health care provider. This will keep the mucous membranes moist and allow them to heal. If you need to use one of these products:  ? Choose one that is water-soluble.  ? Use only as much as you need and use it only as often as needed.  ? Do not lie down right after you use it.  · If you get nosebleeds often, talk with your health care provider about medical treatments. Options may include:  ? Nasal cautery. This treatment stops and prevents nosebleeds by using a chemical swab or electrical device to lightly burn tiny blood vessels inside the nose.  ? Nasal packing. A gauze or other material is placed in the nose to keep constant pressure on the bleeding area.  Contact a health care provider if you:  · Have a fever.  · Get nosebleeds often or more often than usual.  · Bruise very easily.  · Have a nosebleed from having something stuck in your nose.  · Have bleeding in your mouth.  · Vomit or cough up brown material.  · Have a nosebleed after you start a new medicine.  Get help right away if:  · You have a nosebleed after a fall or a head injury.  · Your nosebleed does not go away after 20 minutes.  · You feel dizzy or weak.  · You have unusual bleeding from other parts of your body.  · You have unusual bruising on other parts of your body.  · You become sweaty.  · You vomit blood.  Summary  · A nosebleed is when blood comes out of the nose. Common causes include allergies, an injury to the nose, or cold or dry air.  · Initial treatment includes applying pressure for 5 minutes.  · Moisturizing the nose with saline nasal spray or gel after a nosebleed may help prevent future bleeding.  · Get help right  away if your nosebleed does not go away after 20 minutes.  This information is not intended to replace advice given to you by your health care provider. Make sure you discuss any questions you have with your health care provider.  Document Revised: 10/15/2020 Document Reviewed: 10/15/2020  Elsevier Patient Education © 2021 [employer]

## 2021-07-13 NOTE — PROGRESS NOTES
"Chief Complaint  Nose Bleed    Sanket Gomez presents to [facility]  Nose Bleed   The bleeding has been from the right nare. This is a new problem. Episode onset: last was a few weeks ago (recently traveled out to [place])  The problem has been resolved. The bleeding is associated with dry air (travel to [place], air travel, thinks may have been related to SSRI). Treatments tried: stopped sertraline about 2 weeks ago. There is no history of allergies, a bleeding disorder, colds, frequent nosebleeds or sinus problems.   Denies any previous sinus issues/ nosebleeds.     Objective   Vital Signs:   /76   Pulse 100   Temp 97.5 °F (36.4 °C) (Temporal)   Ht 167.6 cm (65.98\")   Wt 79.4 kg (175 lb)   SpO2 99%   BMI 28.26 kg/m²     Physical Exam  Vitals and nursing note reviewed.   Constitutional:       General: She is not in acute distress.     Appearance: She is well-developed. She is not ill-appearing.   HENT:      Right Ear: Hearing, ear canal and external ear normal. A middle ear effusion is present.      Left Ear: Hearing, tympanic membrane, ear canal and external ear normal.      Nose: No nasal deformity, septal deviation, laceration, mucosal edema or rhinorrhea.      Right Nostril: No foreign body, epistaxis, septal hematoma or occlusion.      Left Nostril: No foreign body, epistaxis, septal hematoma or occlusion.      Right Turbinates: Not swollen.   Cardiovascular:      Rate and Rhythm: Normal rate and regular rhythm.      Heart sounds: Normal heart sounds, S1 normal and S2 normal. No murmur heard.     Pulmonary:      Effort: Pulmonary effort is normal.      Breath sounds: Normal breath sounds. No decreased breath sounds, wheezing, rhonchi or rales.   Skin:     General: Skin is warm and dry.   Neurological:      Mental Status: She is alert and oriented to person, place, and time.   Psychiatric:         Speech: Speech normal.         Behavior: " Behavior normal. Behavior is cooperative.         Thought Content: Thought content normal.         Judgment: Judgment normal.        Result Review :   The following data was reviewed by: ALONA Lira on 07/13/2021:  Common labs    Common Labsle 2/5/21 2/5/21 2/5/21    0800 0800 0800   Glucose  87    BUN  8    Creatinine  0.81    eGFR Non  Am  82    eGFR African Am  100    Sodium  140    Potassium  4.8    Chloride  104    Calcium  9.1    Total Protein  7.0    Albumin  4.10    Total Bilirubin  0.4    Alkaline Phosphatase  54    AST (SGOT)  20    ALT (SGPT)  13    WBC 5.94     Hemoglobin 13.0     Hematocrit 39.3     Platelets 310     Total Cholesterol   226 (A)   Triglycerides   213 (A)   HDL Cholesterol   59   LDL Cholesterol    130 (A)   (A) Abnormal value       Comments are available for some flowsheets but are not being displayed.               Assessment and Plan    Diagnoses and all orders for this visit:    1. Epistaxis (Primary)  Recent epistaxis likely related to airplane travel, dry weather.  Advised patient to continue to monitor for any recurrence of symptoms.  Recent CBC was within normal limits, no evidence of platelet abnormalities, no other significant bruising noted.    2. Anxiety and depression  Recommend restart sertraline at previous dosage of 50 mg daily as she has noticed significant decrease in quality of life after coming off of medications, anxiety has returned.  I suspect nosebleeds were more likely related to dry weather and airplane travel rather than SSRI use.  Advised patient if nosebleeds recur upon restarting medication and notify me, may need referral to ENT for further evaluation.      Follow Up   No follow-ups on file.  Patient was given instructions and counseling regarding her condition or for health maintenance advice. Please see specific information pulled into the AVS if appropriate.       Answers for HPI/ROS submitted by the patient on 7/6/2021  Please describe  your symptoms.: Nose bleeds  Have you had these symptoms before?: No  How long have you been having these symptoms?: 1-2 weeks  Please list any medications you are currently taking for this condition.: Stopped taking meds and they stopped  Please describe any probable cause for these symptoms. : I was on Zoloft. I was also [place] when they were at their worst.  What is the primary reason for your visit?: Other

## 2021-07-20 ENCOUNTER — OFFICE VISIT (OUTPATIENT)
Dept: OBSTETRICS AND GYNECOLOGY | Age: 31
End: 2021-07-20

## 2021-07-20 VITALS
HEIGHT: 66 IN | BODY MASS INDEX: 27.48 KG/M2 | WEIGHT: 171 LBS | DIASTOLIC BLOOD PRESSURE: 72 MMHG | SYSTOLIC BLOOD PRESSURE: 118 MMHG

## 2021-07-20 DIAGNOSIS — Z12.4 SCREENING FOR MALIGNANT NEOPLASM OF THE CERVIX: ICD-10-CM

## 2021-07-20 DIAGNOSIS — Z01.419 ENCOUNTER FOR GYNECOLOGICAL EXAMINATION WITHOUT ABNORMAL FINDING: ICD-10-CM

## 2021-07-20 DIAGNOSIS — Z11.51 SPECIAL SCREENING EXAMINATION FOR HUMAN PAPILLOMAVIRUS (HPV): Primary | ICD-10-CM

## 2021-07-20 PROCEDURE — 99385 PREV VISIT NEW AGE 18-39: CPT | Performed by: OBSTETRICS & GYNECOLOGY

## 2021-07-20 NOTE — PROGRESS NOTES
Subjective     Chief Complaint   Patient presents with   • Gynecologic Exam     New        History of Present Illness    Leonie Gomez is a 31 y.o.  who presents for annual exam.  This is the patient's first visit in our office.  She has an 8-year-old son and is pretty sure that she is not going to have anymore children.  She is interested in an IUD.  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    She has a history of 1 seizure but it was not witnessed so it is unclear if it truly was a seizure.  She was on medication for a while but has been cleared by neurology.  Her menses are regular every 28-30 days, lasting 0-3 days, dysmenorrhea none   Obstetric History:  OB History        1    Para   1    Term   1            AB        Living   1       SAB        TAB        Ectopic        Molar        Multiple        Live Births   1               Menstrual History:     Patient's last menstrual period was 2021.         Current contraception: OCP (estrogen/progesterone)  History of abnormal Pap smear: no  Received Gardasil immunization: no  Perform regular self breast exam : yes  Family history of uterine or ovarian cancer: no  Family History of colon cancer: no  Family history of breast cancer: no    Mammogram: not indicated.  Colonoscopy: not indicated.  DEXA: not indicated.    Exercise: exercises 7 times a week  Calcium/Vitamin D: adequate intake    The following portions of the patient's history were reviewed and updated as appropriate: allergies, current medications, past family history, past medical history, past social history, past surgical history and problem list.    Review of Systems    Review of Systems   Constitutional: Negative for fatigue.   Respiratory: Negative for shortness of breath.    Gastrointestinal: Negative for abdominal pain.   Genitourinary: Negative for dysuria.   Neurological: Negative for headaches.   Psychiatric/Behavioral: Negative for dysphoric  "mood.     Objective   Physical Exam    /72   Ht 167.6 cm (66\")   Wt 77.6 kg (171 lb)   LMP 06/29/2021   Breastfeeding No   BMI 27.60 kg/m²     General:   alert, appears stated age and cooperative   Neck: thyroid normal to palpation   Heart: regular rate and rhythm   Lungs: clear to auscultation bilaterally   Abdomen: soft, non-tender, without masses or organomegaly   Breast: inspection negative, no nipple discharge or bleeding, no masses or nodularity palpable   Vulva: normal, Bartholin's, Urethra, McConnells's normal   Vagina: normal mucosa, normal discharge   Cervix: no cervical motion tenderness and no lesions   Uterus: non-tender, normal shape and consistency   Adnexa: no mass, fullness, tenderness   Rectal: not indicated     Assessment/Plan   Diagnoses and all orders for this visit:    1. Special screening examination for human papillomavirus (HPV) (Primary)  -     IGP, Aptima HPV, Rfx 16 / 18,45    2. Screening for malignant neoplasm of the cervix  -     IGP, Aptima HPV, Rfx 16 / 18,45    3. Encounter for gynecological examination without abnormal finding    We discussed IUDs in detail including risk and benefits with diagrams.  Patient would like a Mirena IUD ordered.  She will return for placement.    All questions answered.  Breast self exam technique reviewed and patient encouraged to perform self-exam monthly.  Discussed healthy lifestyle modifications.  Recommended 30 minutes of aerobic exercise five times per week.  Discussed calcium needs to prevent osteoporosis.                     "

## 2021-07-23 LAB
CYTOLOGIST CVX/VAG CYTO: NORMAL
CYTOLOGY CVX/VAG DOC CYTO: NORMAL
CYTOLOGY CVX/VAG DOC THIN PREP: NORMAL
DX ICD CODE: NORMAL
HIV 1 & 2 AB SER-IMP: NORMAL
HPV I/H RISK 4 DNA CVX QL PROBE+SIG AMP: NEGATIVE
OTHER STN SPEC: NORMAL
STAT OF ADQ CVX/VAG CYTO-IMP: NORMAL

## 2021-08-12 DIAGNOSIS — F41.9 ANXIETY AND DEPRESSION: ICD-10-CM

## 2021-08-12 DIAGNOSIS — F32.A ANXIETY AND DEPRESSION: ICD-10-CM

## 2021-10-07 ENCOUNTER — TELEMEDICINE (OUTPATIENT)
Dept: FAMILY MEDICINE CLINIC | Facility: TELEHEALTH | Age: 31
End: 2021-10-07

## 2021-10-07 DIAGNOSIS — J06.9 UPPER RESPIRATORY TRACT INFECTION, UNSPECIFIED TYPE: Primary | ICD-10-CM

## 2021-10-07 PROCEDURE — 99213 OFFICE O/P EST LOW 20 MIN: CPT | Performed by: NURSE PRACTITIONER

## 2021-10-07 RX ORDER — METHYLPREDNISOLONE 4 MG/1
TABLET ORAL
Qty: 21 TABLET | Refills: 0 | Status: SHIPPED | OUTPATIENT
Start: 2021-10-07 | End: 2021-12-16

## 2021-10-07 RX ORDER — AZITHROMYCIN 250 MG/1
TABLET, FILM COATED ORAL
Qty: 6 TABLET | Refills: 0 | Status: SHIPPED | OUTPATIENT
Start: 2021-10-07 | End: 2021-12-16

## 2021-10-07 RX ORDER — BROMPHENIRAMINE MALEATE, PSEUDOEPHEDRINE HYDROCHLORIDE, AND DEXTROMETHORPHAN HYDROBROMIDE 2; 30; 10 MG/5ML; MG/5ML; MG/5ML
10 SYRUP ORAL 4 TIMES DAILY PRN
Qty: 280 ML | Refills: 0 | Status: SHIPPED | OUTPATIENT
Start: 2021-10-07 | End: 2021-12-16

## 2021-10-07 NOTE — PROGRESS NOTES
Subjective   Chief Complaint   Patient presents with   • Sore Throat       Leonie Gomez is a 31 y.o. female.     Pt reports sore throat that is constant and worsening x yesterday. She also reports mild HA yesterday, slight rhinorrhea and congestion. Her main complaint is sore throat, as it woke her up from sleep this morning, but she is wondering if she should get COVID tested as well. She has not been vaccinated. Denies fever, sick contacts. She is prone to step throat but states this feels a little different.       Sore Throat   This is a new problem. The current episode started yesterday. The problem has been unchanged. There has been no fever. Associated symptoms include congestion, coughing and headaches. Pertinent negatives include no abdominal pain, diarrhea, drooling, ear discharge, ear pain, hoarse voice, plugged ear sensation, neck pain, shortness of breath, stridor, swollen glands, trouble swallowing or vomiting. Treatments tried: warm liquids.        Allergies   Allergen Reactions   • Amoxicillin Rash   • Penicillins Rash       Past Medical History:   Diagnosis Date   • Anxiety    • Dysmenorrhea    • Seizure (HCC) 04/19/2019       Past Surgical History:   Procedure Laterality Date   • BREAST AUGMENTATION  08/2020   • EAR TUBES     • REFRACTIVE SURGERY         Social History     Socioeconomic History   • Marital status:      Spouse name: Not on file   • Number of children: 1   • Years of education: Not on file   • Highest education level: Not on file   Tobacco Use   • Smoking status: Never Smoker   • Smokeless tobacco: Never Used   Vaping Use   • Vaping Use: Never used   Substance and Sexual Activity   • Alcohol use: Yes     Comment: occasionally   • Drug use: Never   • Sexual activity: Yes     Partners: Male     Birth control/protection: OCP       Family History   Problem Relation Age of Onset   • No Known Problems Mother    • Heart disease Father    • Heart attack Father    • Diabetes Father     • Hypertension Father    • Heart disease Maternal Grandfather    • Cancer Maternal Grandfather    • Pancreatic cancer Maternal Grandfather    • Hypertension Maternal Grandfather    • Brain cancer Maternal Aunt 60         Current Outpatient Medications:   •  folic acid (FOLVITE) 400 MCG tablet, Take 400 mcg by mouth Daily., Disp: , Rfl:   •  Multiple Vitamins-Minerals (Hair Skin and Nails Formula) tablet, Take 1 tablet by mouth., Disp: , Rfl:   •  Multiple Vitamins-Minerals (MULTIVITAMIN ADULT PO), Take  by mouth., Disp: , Rfl:   •  norgestimate-ethinyl estradiol (Sprintec 28) 0.25-35 MG-MCG per tablet, Take 1 tablet by mouth Daily., Disp: 28 tablet, Rfl: 11  •  sertraline (ZOLOFT) 50 MG tablet, TAKE ONE TABLET BY MOUTH DAILY, Disp: 90 tablet, Rfl: 1  •  azithromycin (Zithromax Z-Migel) 250 MG tablet, Take 2 tablets by mouth on day 1, then 1 tablet daily on days 2-5, Disp: 6 tablet, Rfl: 0  •  brompheniramine-pseudoephedrine-DM 30-2-10 MG/5ML syrup, Take 10 mL by mouth 4 (Four) Times a Day As Needed for Congestion, Cough or Allergies., Disp: 280 mL, Rfl: 0  •  methylPREDNISolone (MEDROL) 4 MG dose pack, Take as directed on package instructions., Disp: 21 tablet, Rfl: 0      Review of Systems   Constitutional: Negative for chills, diaphoresis, fatigue and fever.   HENT: Positive for congestion, postnasal drip, rhinorrhea and sore throat. Negative for drooling, ear discharge, ear pain, hoarse voice, swollen glands and trouble swallowing.    Respiratory: Positive for cough. Negative for chest tightness, shortness of breath, wheezing and stridor.    Gastrointestinal: Negative for abdominal pain, diarrhea and vomiting.   Musculoskeletal: Negative for myalgias and neck pain.   Neurological: Positive for headache.        There were no vitals filed for this visit.    Objective   Physical Exam  Constitutional:       General: She is not in acute distress.     Appearance: Normal appearance. She is not ill-appearing,  toxic-appearing or diaphoretic.   HENT:      Head: Normocephalic.      Mouth/Throat:      Lips: Pink.      Mouth: Mucous membranes are moist.      Pharynx: Posterior oropharyngeal erythema and uvula swelling (mild, this is not a new problem) present. No oropharyngeal exudate.      Tonsils: No tonsillar exudate.   Pulmonary:      Effort: Pulmonary effort is normal.   Neurological:      Mental Status: She is alert and oriented to person, place, and time.   Psychiatric:         Mood and Affect: Mood normal.         Behavior: Behavior normal.          Procedures     Assessment/Plan   Diagnoses and all orders for this visit:    1. Upper respiratory tract infection, unspecified type (Primary)  -     COVID-19,[facility] ROUTINE, NP/OP SWAB IN TRANSPORT MEDIA OR ESWAB 72 HR TAT - Swab, Nasopharynx; Future  -     brompheniramine-pseudoephedrine-DM 30-2-10 MG/5ML syrup; Take 10 mL by mouth 4 (Four) Times a Day As Needed for Congestion, Cough or Allergies.  Dispense: 280 mL; Refill: 0  -     methylPREDNISolone (MEDROL) 4 MG dose pack; Take as directed on package instructions.  Dispense: 21 tablet; Refill: 0    Other orders  -     azithromycin (Zithromax Z-Migel) 250 MG tablet; Take 2 tablets by mouth on day 1, then 1 tablet daily on days 2-5  Dispense: 6 tablet; Refill: 0    Due to your symptoms I have ordered a COVID-19 test for you to rule this out. Please go to your nearest [facility] for COVID-19 testing. When you arrive, let them know you have an order for testing. We will call you with the results from a BLOCKED NUMBER, usually within 1-3 days.      Take medicine as prescribed.   Start with the Cough syrup and steroid pack, if symptoms do not improve and sore throat is still constant after a few days and COVID is Negative, take the antibiotic.   Alternate tylenol and motrin for pain and/or fever, stay hydrated and rest.   If symptoms worsen or do not improve follow up with your PCP or visit your nearest Urgent  Care Center or ER.    Quarantine until you get your COVID test results and symptoms have improved.   If test is positive you will need to SELF QUARANTINE until you meet the following criteria:   -It has been at least 10 days from the onset of your symptoms,   -A minimum of 24 hours fever free without fever reducing medicine   -AND improved symptoms   **Wear a cloth or surgical mask for 14 days or until symptoms have resolved**          PLAN: Discussed dosing, side effects, recommended other symptomatic care.  Patient should follow up with primary care provider if symptoms worsen, fail to resolve or other symptoms need attention. Patient/family agree to the above.         ALONA Madrigal     This visit was performed via Telehealth.  This patient has been instructed to follow-up with their primary care provider if their symptoms worsen or the treatment provided does not resolve their illness.

## 2021-10-07 NOTE — PATIENT INSTRUCTIONS
Due to your symptoms I have ordered a COVID-19 test for you to rule this out. Please go to your nearest [facility] for COVID-19 testing. When you arrive, let them know you have an order for testing. We will call you with the results from a BLOCKED NUMBER, usually within 1-3 days.      Take medicine as prescribed.   Start with the Cough syrup and steroid pack, if symptoms do not improve and sore throat is still constant after a few days and COVID is Negative, take the antibiotic.   Alternate tylenol and motrin for pain and/or fever, stay hydrated and rest.   If symptoms worsen or do not improve follow up with your PCP or visit your nearest Urgent Care Center or ER.    Quarantine until you get your COVID test results and symptoms have improved.   If test is positive you will need to SELF QUARANTINE until you meet the following criteria:   -It has been at least 10 days from the onset of your symptoms,   -A minimum of 24 hours fever free without fever reducing medicine   -AND improved symptoms   **Wear a cloth or surgical mask for 14 days or until symptoms have resolved**            How to Quarantine at Home  Information for Patients and Families    These instructions are for people with confirmed or suspected COVID-19 who do not need to be hospitalized and those with confirmed COVID-19 who were hospitalized and discharged to care for themselves at home.    If you were tested through the Health Department  The Health Department will monitor your wellbeing.  If it is determined that you do not need to be hospitalized and can be isolated at home, you will be monitored by staff from your local or state health department.     If you were tested through a Commercial Lab  You will need to monitor yourself and report changes in your symptoms to your doctor.  See the section below called Monitor Your Symptoms.    Follow these steps until a healthcare provider or local or state health department says you can  return to your normal activities.    Stay home except to get medical care  • Restrict activities outside your home, except for getting medical care.   • Do not go to work, school, or public areas.   • Avoid using public transportation, ride-sharing, or taxis.    Separate yourself from other people and animals in your home  People  As much as possible, you should stay in a specific room and away from other people in your home. Also, you should use a separate bathroom, if available.    Animals  You should restrict contact with pets and other animals while you are sick with COVID-19, just like you would around other people. When possible, have another member of your household care for your animals while you are sick. If you are sick with COVID-19, avoid contact with your pet, including petting, snuggling, being kissed or licked, and sharing food. If you must care for your pet or be around animals while you are sick, wash your hands before and after you interact with pets and wear a facemask. See COVID-19 and Animals for more information.    Call ahead before visiting your doctor  If you have a medical appointment, call the healthcare provider and tell them that you have or may have COVID-19. This information will help the healthcare provider’s office take steps to keep other people from getting infected or exposed.    Wear a facemask  You should wear a facemask when you are around other people (e.g., sharing a room or vehicle) or pets and before you enter a healthcare provider’s office.     If you are not able to wear a facemask (for example, because it causes trouble breathing), then people who live with you should not stay in the same room with you, or they should wear a facemask if they enter your room.    Cover your coughs and sneezes  • Cover your mouth and nose with a tissue when you cough or sneeze.   • Throw used tissues in a lined trash can.   • Immediately wash your hands with soap and water for at least 20  seconds or, if soap and water are not available, clean your hands with an alcohol-based hand  that contains at least 60% alcohol.    Clean your hands often  • Wash your hands often with soap and water for at least 20 seconds, especially after blowing your nose, coughing, or sneezing; going to the bathroom; and before eating or preparing food.     • If soap and water are not readily available, use an alcohol-based hand  with at least 60% alcohol, covering all surfaces of your hands and rubbing them together until they feel dry.    • Soap and water are the best option if hands are visibly dirty. Avoid touching your eyes, nose, and mouth with unwashed hands.    Avoid sharing personal household items  • You should not share dishes, drinking glasses, cups, eating utensils, towels, or bedding with other people or pets in your home.   • After using these items, they should be washed thoroughly with soap and water.    Clean all “high-touch” surfaces everyday  • High touch surfaces include counters, tabletops, doorknobs, bathroom fixtures, toilets, phones, keyboards, tablets, and bedside tables.   • Also, clean any surfaces that may have blood, stool, or body fluids on them.   • Use a household cleaning spray or wipe, according to the label instructions. Labels contain instructions for safe and effective use of the cleaning product, including precautions you should take when applying the product, such as wearing gloves and making sure you have good ventilation during use of the product.    Monitor your symptoms  • Seek prompt medical attention if your illness is worsening (e.g., difficulty breathing).   • Before seeking care, call your healthcare provider and tell them that you have, or are being evaluated for, COVID-19.   • Put on a facemask before you enter the facility.     • These steps will help the healthcare provider’s office to keep other people in the office or waiting room from getting infected or  exposed.   • Persons who are placed under active monitoring or facilitated self-monitoring should follow instructions provided by their local health department or occupational health professionals, as appropriate.  • If you have a medical emergency and need to call 911, notify the dispatch personnel that you have, or are being evaluated for COVID-19. If possible, put on a facemask before emergency medical services arrive.    Discontinuing home isolation  Patients with confirmed COVID-19 should remain under home isolation precautions until the risk of secondary transmission to others is thought to be low. The decision to discontinue home isolation precautions should be made on a case-by-case basis, in consultation with healthcare providers and state and local health departments.    The below content are for household members, intimate partners, and caregivers of a patient with symptomatic laboratory-confirmed COVID-19 or a patient under investigation:    Household members, intimate partners, and caregivers may have close contact with a person with symptomatic, laboratory-confirmed COVID-19 or a person under investigation.     Close contacts should monitor their health; they should call their healthcare provider right away if they develop symptoms suggestive of COVID-19 (e.g., fever, cough, shortness of breath)     Close contacts should also follow these recommendations:  • Make sure that you understand and can help the patient follow their healthcare provider’s instructions for medication(s) and care. You should help the patient with basic needs in the home and provide support for getting groceries, prescriptions, and other personal needs.  • Monitor the patient’s symptoms. If the patient is getting sicker, call his or her healthcare provider and tell them that the patient has laboratory-confirmed COVID-19. This will help the healthcare provider’s office take steps to keep other people in the office or waiting room  from getting infected. Ask the healthcare provider to call the local or state health department for additional guidance. If the patient has a medical emergency and you need to call 911, notify the dispatch personnel that the patient has, or is being evaluated for COVID-19.  • Household members should stay in another room or be  from the patient as much as possible. Household members should use a separate bedroom and bathroom, if available.  • Prohibit visitors who do not have an essential need to be in the home.  • Household members should care for any pets in the home. Do not handle pets or other animals while sick.  For more information, see COVID-19 and Animals.  • Make sure that shared spaces in the home have good air flow, such as by an air conditioner or an opened window, weather permitting.  • Perform hand hygiene frequently. Wash your hands often with soap and water for at least 20 seconds or use an alcohol-based hand  that contains 60 to 95% alcohol, covering all surfaces of your hands and rubbing them together until they feel dry. Soap and water should be used preferentially if hands are visibly dirty.  • Avoid touching your eyes, nose, and mouth with unwashed hands.  • The patient should wear a facemask when you are around other people. If the patient is not able to wear a facemask (for example, because it causes trouble breathing), you, as the caregiver, should wear a mask when you are in the same room as the patient.  • Wear a disposable facemask and gloves when you touch or have contact with the patient’s blood, stool, or body fluids, such as saliva, sputum, nasal mucus, vomit, or urine.   o Throw out disposable facemasks and gloves after using them. Do not reuse.  o When removing personal protective equipment, first remove and dispose of gloves. Then, immediately clean your hands with soap and water or alcohol-based hand . Next, remove and dispose of facemask, and  immediately clean your hands again with soap and water or alcohol-based hand .  • Avoid sharing household items with the patient. You should not share dishes, drinking glasses, cups, eating utensils, towels, bedding, or other items. After the patient uses these items, you should wash them thoroughly (see below “Wash laundry thoroughly”).  • Clean all “high-touch” surfaces, such as counters, tabletops, doorknobs, bathroom fixtures, toilets, phones, keyboards, tablets, and bedside tables, every day. Also, clean any surfaces that may have blood, stool, or body fluids on them.   o Use a household cleaning spray or wipe, according to the label instructions. Labels contain instructions for safe and effective use of the cleaning product including precautions you should take when applying the product, such as wearing gloves and making sure you have good ventilation during use of the product.  • Wash laundry thoroughly.   o Immediately remove and wash clothes or bedding that have blood, stool, or body fluids on them.  o Wear disposable gloves while handling soiled items and keep soiled items away from your body. Clean your hands (with soap and water or an alcohol-based hand ) immediately after removing your gloves.  o Read and follow directions on labels of laundry or clothing items and detergent. In general, using a normal laundry detergent according to washing machine instructions and dry thoroughly using the warmest temperatures recommended on the clothing label.  • Place all used disposable gloves, facemasks, and other contaminated items in a lined container before disposing of them with other household waste. Clean your hands (with soap and water or an alcohol-based hand ) immediately after handling these items. Soap and water should be used preferentially if hands are visibly dirty.  • Discuss any additional questions with your state or local health department or healthcare  provider.    Adapted from information provided by the Centers for Disease Control and Prevention.  For more information, visit https://www.cdc.gov/coronavirus/2019-ncov/hcp/guidance-prevent-spread.html      10 Things You Can Do to Manage Your COVID-19 Symptoms at Home  If you have possible or confirmed COVID-19:  1. Stay home except to get medical care.  2. Monitor your symptoms carefully. If your symptoms get worse, call your healthcare provider immediately.  3. Get rest and stay hydrated.  4. If you have a medical appointment, call the healthcare provider ahead of time and tell them that you have or may have COVID-19.  5. For medical emergencies, call 911 and notify the dispatch personnel that you have or may have COVID-19.  6. Cover your cough and sneezes with a tissue or use the inside of your elbow.  7. Wash your hands often with soap and water for at least 20 seconds or clean your hands with an alcohol-based hand  that contains at least 60% alcohol.  8. As much as possible, stay in a specific room and away from other people in your home. Also, you should use a separate bathroom, if available. If you need to be around other people in or outside of the home, wear a mask.  9. Avoid sharing personal items with other people in your household, like dishes, towels, and bedding.  10. Clean all surfaces that are touched often, like counters, tabletops, and doorknobs. Use household cleaning sprays or wipes according to the label instructions.  cdc.gov/coronavirus  07/16/2021  This information is not intended to replace advice given to you by your health care provider. Make sure you discuss any questions you have with your health care provider.  Document Revised: 08/04/2021 Document Reviewed: 08/04/2021  Elsevier Patient Education © 2021 [employer]      Viral Respiratory Infection  A respiratory infection is an illness that affects part of the respiratory system, such as the lungs, nose, or throat. A  respiratory infection that is caused by a virus is called a viral respiratory infection.  Common types of viral respiratory infections include:  · A cold.  · The flu (influenza).  · A respiratory syncytial virus (RSV) infection.  What are the causes?  This condition is caused by a virus.  What are the signs or symptoms?  Symptoms of this condition include:  · A stuffy or runny nose.  · Yellow or green nasal discharge.  · A cough.  · Sneezing.  · Fatigue.  · Achy muscles.  · A sore throat.  · Sweating or chills.  · A fever.  · A headache.  How is this diagnosed?  This condition may be diagnosed based on:  · Your symptoms.  · A physical exam.  · Testing of nasal swabs.  How is this treated?  This condition may be treated with medicines, such as:  · Antiviral medicine. This may shorten the length of time a person has symptoms.  · Expectorants. These make it easier to cough up mucus.  · Decongestant nasal sprays.  · Acetaminophen or NSAIDs to relieve fever and pain.  Antibiotic medicines are not prescribed for viral infections. This is because antibiotics are designed to kill bacteria. They are not effective against viruses.  Follow these instructions at home:    Managing pain and congestion  · Take over-the-counter and prescription medicines only as told by your health care provider.  · If you have a sore throat, gargle with a salt-water mixture 3-4 times a day or as needed. To make a salt-water mixture, completely dissolve ½-1 tsp of salt in 1 cup of warm water.  · Use nose drops made from salt water to ease congestion and soften raw skin around your nose.  · Drink enough fluid to keep your urine pale yellow. This helps prevent dehydration and helps loosen up mucus.  General instructions  · Rest as much as possible.  · Do not drink alcohol.  · Do not use any products that contain nicotine or tobacco, such as cigarettes and e-cigarettes. If you need help quitting, ask your health care provider.  · Keep all follow-up  visits as told by your health care provider. This is important.  How is this prevented?    · Get an annual flu shot. You may get the flu shot in late summer, fall, or winter. Ask your health care provider when you should get your flu shot.  · Avoid exposing others to your respiratory infection.  ? Stay home from work or school as told by your health care provider.  ? Wash your hands with soap and water often, especially after you cough or sneeze. If soap and water are not available, use alcohol-based hand .  · Avoid contact with people who are sick during cold and flu season. This is generally fall and winter.  Contact a health care provider if:  · Your symptoms last for 10 days or longer.  · Your symptoms get worse over time.  · You have a fever.  · You have severe sinus pain in your face or forehead.  · The glands in your jaw or neck become very swollen.  Get help right away if you:  · Feel pain or pressure in your chest.  · Have shortness of breath.  · Faint or feel like you will faint.  · Have severe and persistent vomiting.  · Feel confused or disoriented.  Summary  · A respiratory infection is an illness that affects part of the respiratory system, such as the lungs, nose, or throat. A respiratory infection that is caused by a virus is called a viral respiratory infection.  · Common types of viral respiratory infections are a cold, influenza, and respiratory syncytial virus (RSV) infection.  · Symptoms of this condition include a stuffy or runny nose, cough, sneezing, fatigue, achy muscles, sore throat, and fevers or chills.  · Antibiotic medicines are not prescribed for viral infections. This is because antibiotics are designed to kill bacteria. They are not effective against viruses.  This information is not intended to replace advice given to you by your health care provider. Make sure you discuss any questions you have with your health care provider.  Document Revised: 12/26/2019 Document  Reviewed: 01/28/2019  [employer] Patient Education © 2021 [employer]      Pharyngitis    Pharyngitis is a sore throat (pharynx). This is when there is redness, pain, and swelling in your throat. Most of the time, this condition gets better on its own. In some cases, you may need medicine.  Follow these instructions at home:  · Take over-the-counter and prescription medicines only as told by your doctor.  ? If you were prescribed an antibiotic medicine, take it as told by your doctor. Do not stop taking the antibiotic even if you start to feel better.  ? Do not give children aspirin. Aspirin has been linked to Reye syndrome.  · Drink enough water and fluids to keep your pee (urine) clear or pale yellow.  · Get a lot of rest.  · Rinse your mouth (gargle) with a salt-water mixture 3-4 times a day or as needed. To make a salt-water mixture, completely dissolve ½-1 tsp of salt in 1 cup of warm water.  · If your doctor approves, you may use throat lozenges or sprays to soothe your throat.  Contact a doctor if:  · You have large, tender lumps in your neck.  · You have a rash.  · You cough up green, yellow-brown, or bloody spit.  Get help right away if:  · You have a stiff neck.  · You drool or cannot swallow liquids.  · You cannot drink or take medicines without throwing up.  · You have very bad pain that does not go away with medicine.  · You have problems breathing, and it is not from a stuffy nose.  · You have new pain and swelling in your knees, ankles, wrists, or elbows.  Summary  · Pharyngitis is a sore throat (pharynx). This is when there is redness, pain, and swelling in your throat.  · If you were prescribed an antibiotic medicine, take it as told by your doctor. Do not stop taking the antibiotic even if you start to feel better.  · Most of the time, pharyngitis gets better on its own. Sometimes, you may need medicine.  This information is not intended to replace advice given to you by your health care provider.  Make sure you discuss any questions you have with your health care provider.  Document Revised: 11/30/2018 Document Reviewed: 01/23/2018  Elsevier Patient Education © 2021 [employer] Inc[employer]

## 2021-12-16 ENCOUNTER — OFFICE VISIT (OUTPATIENT)
Dept: INTERNAL MEDICINE | Age: 31
End: 2021-12-16

## 2021-12-16 VITALS
OXYGEN SATURATION: 99 % | TEMPERATURE: 97.5 F | HEIGHT: 66 IN | DIASTOLIC BLOOD PRESSURE: 82 MMHG | BODY MASS INDEX: 27.97 KG/M2 | WEIGHT: 174 LBS | SYSTOLIC BLOOD PRESSURE: 130 MMHG | HEART RATE: 100 BPM

## 2021-12-16 DIAGNOSIS — Z30.41 ENCOUNTER FOR SURVEILLANCE OF CONTRACEPTIVE PILLS: ICD-10-CM

## 2021-12-16 DIAGNOSIS — F41.9 ANXIETY: Primary | ICD-10-CM

## 2021-12-16 DIAGNOSIS — Z00.00 ROUTINE HEALTH MAINTENANCE: ICD-10-CM

## 2021-12-16 PROCEDURE — 99213 OFFICE O/P EST LOW 20 MIN: CPT | Performed by: NURSE PRACTITIONER

## 2021-12-16 NOTE — PROGRESS NOTES
"Chief Complaint  Anxiety and Depression    Subjective          Leonie Gomez presents to [facility]  Anxiety  Presents for follow-up visit. Patient reports no decreased concentration, depressed mood or nervous/anxious behavior. The severity of symptoms is mild. The quality of sleep is good. Nighttime awakenings: none.     Compliance with medications is %.       Objective   Vital Signs:   /82   Pulse 100   Temp 97.5 °F (36.4 °C) (Temporal)   Ht 167.6 cm (65.98\")   Wt 78.9 kg (174 lb)   SpO2 99%   BMI 28.10 kg/m²     Physical Exam  Vitals and nursing note reviewed.   Constitutional:       General: She is not in acute distress.     Appearance: She is well-developed. She is not ill-appearing.   Cardiovascular:      Rate and Rhythm: Normal rate and regular rhythm.      Heart sounds: Normal heart sounds, S1 normal and S2 normal. No murmur heard.      Pulmonary:      Effort: Pulmonary effort is normal.      Breath sounds: Normal breath sounds. No decreased breath sounds, wheezing, rhonchi or rales.   Skin:     General: Skin is warm and dry.   Neurological:      Mental Status: She is alert and oriented to person, place, and time.   Psychiatric:         Speech: Speech normal.         Behavior: Behavior normal. Behavior is cooperative.         Thought Content: Thought content normal.         Judgment: Judgment normal.        Result Review :   The following data was reviewed by: ALONA Lira on 12/16/2021:  Common labs    Common Labsle 2/5/21 2/5/21 2/5/21    0800 0800 0800   Glucose  87    BUN  8    Creatinine  0.81    eGFR Non  Am  82    eGFR African Am  100    Sodium  140    Potassium  4.8    Chloride  104    Calcium  9.1    Total Protein  7.0    Albumin  4.10    Total Bilirubin  0.4    Alkaline Phosphatase  54    AST (SGOT)  20    ALT (SGPT)  13    WBC 5.94     Hemoglobin 13.0     Hematocrit 39.3     Platelets 310     Total Cholesterol   226 (A) "   Triglycerides   213 (A)   HDL Cholesterol   59   LDL Cholesterol    130 (A)   (A) Abnormal value       Comments are available for some flowsheets but are not being displayed.                  Assessment and Plan    Diagnoses and all orders for this visit:    1. Anxiety (Primary)  Stable on current dosage of sertraline, continue same.  Follow-up in office in 6 months for recheck as well as annual physical.    2. Routine health maintenance    3. Encounter for surveillance of contraceptive pills  Under the regular care of GYN.  Up-to-date on annual exam and Pap smear.         Follow Up   Return in about 6 months (around 6/16/2022) for Next scheduled follow up, Annual physical with fasting labs 1 week prior .  Patient was given instructions and counseling regarding her condition or for health maintenance advice. Please see specific information pulled into the AVS if appropriate.

## 2021-12-17 DIAGNOSIS — N94.6 DYSMENORRHEA: ICD-10-CM

## 2022-01-03 ENCOUNTER — TELEPHONE (OUTPATIENT)
Dept: INTERNAL MEDICINE | Age: 32
End: 2022-01-03

## 2022-01-03 NOTE — TELEPHONE ENCOUNTER
Caller: Leonie Gomez    Relationship to patient: Self    Best call back number: [phone]     Chief complaint: N/A     Type of visit: LAB     Requested date: N/A     If rescheduling, when is the original appointment: 06/16/2022     Additional notes: PATIENT WANTS TO KNOW IF SHE NEEDS TO KEEP THE LAB APPOINTMENT SINCE SHE IS RE-ESTABLISHING PROVIDERS

## 2022-02-24 DIAGNOSIS — F41.9 ANXIETY AND DEPRESSION: ICD-10-CM

## 2022-02-24 DIAGNOSIS — F32.A ANXIETY AND DEPRESSION: ICD-10-CM

## 2022-05-10 ENCOUNTER — OFFICE VISIT (OUTPATIENT)
Dept: INTERNAL MEDICINE | Age: 32
End: 2022-05-10

## 2022-05-10 ENCOUNTER — HOSPITAL ENCOUNTER (OUTPATIENT)
Dept: INFUSION THERAPY | Facility: HOSPITAL | Age: 32
Discharge: HOME | End: 2022-05-10
Admitting: NURSE PRACTITIONER

## 2022-05-10 VITALS
TEMPERATURE: 96.8 F | RESPIRATION RATE: 20 BRPM | SYSTOLIC BLOOD PRESSURE: 105 MMHG | OXYGEN SATURATION: 100 % | HEART RATE: 103 BPM | DIASTOLIC BLOOD PRESSURE: 81 MMHG

## 2022-05-10 VITALS
HEIGHT: 66 IN | HEART RATE: 119 BPM | BODY MASS INDEX: 25.55 KG/M2 | DIASTOLIC BLOOD PRESSURE: 70 MMHG | WEIGHT: 159 LBS | OXYGEN SATURATION: 95 % | TEMPERATURE: 97.1 F | SYSTOLIC BLOOD PRESSURE: 98 MMHG

## 2022-05-10 DIAGNOSIS — R11.0 NAUSEA: ICD-10-CM

## 2022-05-10 DIAGNOSIS — R10.9 ABDOMINAL CRAMPING: ICD-10-CM

## 2022-05-10 DIAGNOSIS — E86.0 DEHYDRATION: Primary | ICD-10-CM

## 2022-05-10 DIAGNOSIS — R19.7 DIARRHEA, UNSPECIFIED TYPE: ICD-10-CM

## 2022-05-10 DIAGNOSIS — R42 DIZZINESS: ICD-10-CM

## 2022-05-10 DIAGNOSIS — R19.7 ACUTE DIARRHEA: ICD-10-CM

## 2022-05-10 PROCEDURE — 96360 HYDRATION IV INFUSION INIT: CPT

## 2022-05-10 PROCEDURE — 99213 OFFICE O/P EST LOW 20 MIN: CPT | Performed by: NURSE PRACTITIONER

## 2022-05-10 PROCEDURE — 96361 HYDRATE IV INFUSION ADD-ON: CPT

## 2022-05-10 RX ORDER — ONDANSETRON 4 MG/1
4 TABLET, FILM COATED ORAL EVERY 8 HOURS PRN
Qty: 30 TABLET | Refills: 0 | Status: SHIPPED | OUTPATIENT
Start: 2022-05-10 | End: 2022-06-27

## 2022-05-10 RX ORDER — SODIUM CHLORIDE 9 MG/ML
2000 INJECTION, SOLUTION INTRAVENOUS ONCE
Status: CANCELLED | OUTPATIENT
Start: 2022-05-10

## 2022-05-10 RX ORDER — SODIUM CHLORIDE 9 MG/ML
2000 INJECTION, SOLUTION INTRAVENOUS ONCE
Status: COMPLETED | OUTPATIENT
Start: 2022-05-10 | End: 2022-05-10

## 2022-05-10 RX ORDER — SODIUM CHLORIDE 9 MG/ML
2000 INJECTION, SOLUTION INTRAVENOUS ONCE
OUTPATIENT
Start: 2022-05-10

## 2022-05-10 RX ORDER — DICYCLOMINE HYDROCHLORIDE 10 MG/1
10 CAPSULE ORAL
Qty: 30 CAPSULE | Refills: 0 | Status: SHIPPED | OUTPATIENT
Start: 2022-05-10 | End: 2022-06-27

## 2022-05-10 RX ADMIN — SODIUM CHLORIDE 1000 ML: 9 INJECTION, SOLUTION INTRAVENOUS at 14:00

## 2022-05-10 NOTE — PROGRESS NOTES
I N T E R N A L  M E D I C I N E  DARA MAYNARD, ALONA      ENCOUNTER DATE:  05/10/2022    Leonie Gomez / 32 y.o. / female      CHIEF COMPLAINT / REASON FOR OFFICE VISIT     Diarrhea (DIZZINESS, FATIGUE,  ABDOMINAL PAIN  SINCE SATURDAY )      ASSESSMENT & PLAN     1. Dehydration  -Pedialyte  -We will give 2 L normal saline through the infusion center for dehydration with concurrent hypotension and tachycardia  - Ambulatory Referral to [facility] For Infusion Treatment  - CBC w AUTO Differential; Future  - Comprehensive Metabolic Panel; Future  - Magnesium; Future    2. Diarrhea, unspecified type  -Imodium as needed   - Stool Culture (Reference Lab) - Stool, Per Rectum; Future  - Clostridium Difficile Toxin, PCR - Stool, Per Rectum; Future  - Calprotectin, Fecal - Stool, Per Rectum; Future  - Ambulatory Referral to [facility] For Infusion Treatment  - CBC w AUTO Differential; Future  - Comprehensive Metabolic Panel; Future  - Magnesium; Future    3. Dizziness  - Ambulatory Referral to [facility] For Infusion Treatment    Orders Placed This Encounter   Procedures   • Stool Culture (Reference Lab) - Stool, Per Rectum   • Clostridium Difficile Toxin, PCR - Stool, Per Rectum   • Calprotectin, Fecal - Stool, Per Rectum   • Comprehensive Metabolic Panel   • Magnesium   • Ambulatory Referral to [facility] For Infusion Treatment   • CBC w AUTO Differential     New Medications Ordered This Visit   Medications   • ondansetron (Zofran) 4 MG tablet     Sig: Take 1 tablet by mouth Every 8 (Eight) Hours As Needed for Nausea or Vomiting.     Dispense:  30 tablet     Refill:  0   • dicyclomine (Bentyl) 10 MG capsule     Sig: Take 1 capsule by mouth 4 (Four) Times a Day Before Meals & at Bedtime.     Dispense:  30 capsule     Refill:  0       SUMMARY/DISCUSSION  • Follow-up on Friday to reassess, earlier if needed    Next Appointment with me: 5/13/2022    Return in about 3 days (around 5/13/2022) for Next scheduled follow up; will obtain repeat labs  "friday, provide stool kit from labs .      VITAL SIGNS     Visit Vitals  BP 98/70 (BP Location: Right arm)   Pulse 119   Temp 97.1 °F (36.2 °C)   Ht 167.6 cm (65.98\")   Wt 72.1 kg (159 lb)   SpO2 95%   BMI 25.68 kg/m²     Wt Readings from Last 3 Encounters:   05/10/22 72.1 kg (159 lb)   12/16/21 78.9 kg (174 lb)   07/20/21 77.6 kg (171 lb)     Body mass index is 25.68 kg/m².      MEDICATIONS AT THE TIME OF OFFICE VISIT     Current Outpatient Medications on File Prior to Visit   Medication Sig   • folic acid (FOLVITE) 400 MCG tablet Take 400 mcg by mouth Daily.   • Multiple Vitamins-Minerals (Hair Skin and Nails Formula) tablet Take 1 tablet by mouth.   • Multiple Vitamins-Minerals (MULTIVITAMIN ADULT PO) Take  by mouth.   • sertraline (ZOLOFT) 50 MG tablet Take 1 tablet by mouth Daily.   • Sprintec 28 0.25-35 MG-MCG per tablet TAKE ONE TABLET BY MOUTH DAILY     No current facility-administered medications on file prior to visit.         HISTORY OF PRESENT ILLNESS     Patient presents with 4-day history of profuse diarrhea, abdominal cramping, nausea without vomiting.  Sick contact a family member who also had viral gastrointestinal illness.  Tachycardic today with hypotension along with dizziness.  Significant decrease in oral fluid intake.     REVIEW OF SYSTEMS     Constitutional neg except per HPI   Resp neg  CV neg  GI nausea, abdominal cramping, diarrhea  Neuro dizziness     PHYSICAL EXAMINATION     Physical Exam  Constitutional  No distress  Cardiovascular Rate  normal . Rhythm: regular . Heart sounds:  normal  Pulmonary/Chest  Effort normal. Breath sounds:  normal  GI active tenderness, distention rebound  Psychiatric  Alert. Judgment and thought content normal. Mood normal     REVIEWED DATA     Labs:         Imaging:           Medical Tests:             Summary of old records / correspondence / consultant report:           Request outside records:           *Examiner was wearing medical surgical mask, face " shield and exam gloves during the entire duration of the visit. Patient was masked the entire time.   Minimum social distance of 6 ft maintained entire visit except if physical contact was necessary as documented.     Dictated utilizing Dragon dictation

## 2022-05-13 ENCOUNTER — OFFICE VISIT (OUTPATIENT)
Dept: INTERNAL MEDICINE | Age: 32
End: 2022-05-13

## 2022-05-13 VITALS
HEART RATE: 90 BPM | HEIGHT: 66 IN | OXYGEN SATURATION: 98 % | TEMPERATURE: 97.6 F | WEIGHT: 167 LBS | DIASTOLIC BLOOD PRESSURE: 70 MMHG | SYSTOLIC BLOOD PRESSURE: 120 MMHG | BODY MASS INDEX: 26.84 KG/M2

## 2022-05-13 DIAGNOSIS — E86.0 DEHYDRATION: Primary | ICD-10-CM

## 2022-05-13 PROCEDURE — 99212 OFFICE O/P EST SF 10 MIN: CPT | Performed by: NURSE PRACTITIONER

## 2022-05-13 NOTE — PROGRESS NOTES
"    I N T E R N A L  M E D I C I N E  Casie Church, APRN    ENCOUNTER DATE:  05/13/2022    Leonie Gomez / 32 y.o. / female      CHIEF COMPLAINT / REASON FOR OFFICE VISIT     Follow-up (Dehydration )      ASSESSMENT & PLAN     Diagnoses and all orders for this visit:    1. Dehydration (Primary)  -     CBC w AUTO Differential  -     Comprehensive metabolic panel        SUMMARY/DISCUSSION  • Follow up with José Luis Dixon, 6.27.2022  • I spent 20 min in direct care of this patient on this date of service. This time includes times spent by me in the following activities: Preparing for the visit, obtaining and/or reviewing a separately obtained history, performing a medically appropriate examination and/or evaluation, reviewing medical records, reviewing tests, ordering medications, tests, or procedures, counseling and educating the patient, documenting information in the medical record and reviewing office note/correspondence from other providers.       Return in about 6 weeks (around 6/27/2022) for Next scheduled follow up.      VITAL SIGNS     Visit Vitals  /70   Pulse 90   Temp 97.6 °F (36.4 °C) (Temporal)   Ht 167.6 cm (65.98\")   Wt 75.8 kg (167 lb)   LMP 05/02/2022   SpO2 98%   BMI 26.97 kg/m²           BP Readings from Last 3 Encounters:   05/13/22 120/70   05/10/22 105/81   05/10/22 98/70     Wt Readings from Last 3 Encounters:   05/13/22 75.8 kg (167 lb)   05/10/22 72.1 kg (159 lb)   12/16/21 78.9 kg (174 lb)     Body mass index is 26.97 kg/m².    Blood pressure readings recorded on patient flowsheet:  No flowsheet data found.          MEDICATIONS AT THE TIME OF OFFICE VISIT     Current Outpatient Medications on File Prior to Visit   Medication Sig Dispense Refill   • dicyclomine (Bentyl) 10 MG capsule Take 1 capsule by mouth 4 (Four) Times a Day Before Meals & at Bedtime. 30 capsule 0   • folic acid (FOLVITE) 400 MCG tablet Take 400 mcg by mouth Daily.     • Multiple Vitamins-Minerals (Hair Skin and " Nails Formula) tablet Take 1 tablet by mouth.     • ondansetron (Zofran) 4 MG tablet Take 1 tablet by mouth Every 8 (Eight) Hours As Needed for Nausea or Vomiting. 30 tablet 0   • sertraline (ZOLOFT) 50 MG tablet Take 1 tablet by mouth Daily. 90 tablet 1   • Sprintec 28 0.25-35 MG-MCG per tablet TAKE ONE TABLET BY MOUTH DAILY 28 tablet 11     No current facility-administered medications on file prior to visit.        HISTORY OF PRESENT ILLNESS     32-year-old female being seen today for follow-up visit after dehydration.    Dehydration: Received 2 L of IV normal saline at the infusion center, states feeling much improved. Skin turgor good, negative for dizziness, diarrhea, abdominal pain, or dry mouth. Repeat labs ordered to include CBC and CMP. Discussion of 32 ounces of water daily and healthy diet.     Patient Care Team:  José Luis Dixon APRN as PCP - General (Internal Medicine)    REVIEW OF SYSTEMS     Review of Systems   Constitutional: Negative.    HENT: Negative.    Eyes: Negative.    Respiratory: Negative.    Cardiovascular: Negative.    Gastrointestinal: Negative.    Genitourinary: Negative.    Skin: Negative.    Neurological: Negative.    Hematological: Negative.           PHYSICAL EXAMINATION     Physical Exam  Constitutional:       Appearance: She is normal weight.   HENT:      Mouth/Throat:      Mouth: Mucous membranes are moist.   Eyes:      Pupils: Pupils are equal, round, and reactive to light.   Cardiovascular:      Rate and Rhythm: Normal rate and regular rhythm.      Pulses: Normal pulses.      Heart sounds: Normal heart sounds.   Pulmonary:      Effort: Pulmonary effort is normal.      Breath sounds: Normal breath sounds.   Musculoskeletal:         General: Normal range of motion.      Cervical back: Normal range of motion and neck supple.   Skin:     General: Skin is warm and dry.      Capillary Refill: Capillary refill takes less than 2 seconds.   Neurological:      Mental Status: She is alert  and oriented to person, place, and time.   Psychiatric:         Mood and Affect: Mood normal.         Thought Content: Thought content normal.         Judgment: Judgment normal.           REVIEWED DATA     Labs:     Lab Results   Component Value Date     02/05/2021    K 4.8 02/05/2021    CALCIUM 9.1 02/05/2021    AST 20 02/05/2021    ALT 13 02/05/2021    BUN 8 02/05/2021    CREATININE 0.81 02/05/2021    CREATININE 0.62 03/14/2019    EGFRIFNONA 82 02/05/2021    EGFRIFAFRI 100 02/05/2021       No results found for: HGBA1C    Lab Results   Component Value Date     (H) 02/05/2021    HDL 59 02/05/2021    TRIG 213 (H) 02/05/2021       Lab Results   Component Value Date    TSH 4.050 02/05/2021    TSH 2.35 03/14/2019       Lab Results   Component Value Date    WBC 5.94 02/05/2021    HGB 13.0 02/05/2021     02/05/2021       No results found for: MICROALBUR        Imaging:           Medical Tests:           Summary of old records / correspondence / consultant report:           Request outside records:             *Examiner was wearing mask protection during the entire duration of the visit. Patient was masked the entire time. Minimum social distance of 6 ft maintained entire visit except if physical contact was necessary as documented.       Template created by ALONA Sams

## 2022-05-14 LAB
ALBUMIN SERPL-MCNC: 4 G/DL (ref 3.8–4.8)
ALBUMIN/GLOB SERPL: 1.5 {RATIO} (ref 1.2–2.2)
ALP SERPL-CCNC: 70 IU/L (ref 44–121)
ALT SERPL-CCNC: 43 IU/L (ref 0–32)
AST SERPL-CCNC: 28 IU/L (ref 0–40)
BASOPHILS # BLD AUTO: 0.1 X10E3/UL (ref 0–0.2)
BASOPHILS NFR BLD AUTO: 2 %
BILIRUB SERPL-MCNC: <0.2 MG/DL (ref 0–1.2)
BUN SERPL-MCNC: 13 MG/DL (ref 6–20)
BUN/CREAT SERPL: 19 (ref 9–23)
CALCIUM SERPL-MCNC: 8.6 MG/DL (ref 8.7–10.2)
CHLORIDE SERPL-SCNC: 106 MMOL/L (ref 96–106)
CO2 SERPL-SCNC: 21 MMOL/L (ref 20–29)
CREAT SERPL-MCNC: 0.68 MG/DL (ref 0.57–1)
EGFRCR SERPLBLD CKD-EPI 2021: 119 ML/MIN/1.73
EOSINOPHIL # BLD AUTO: 0.1 X10E3/UL (ref 0–0.4)
EOSINOPHIL NFR BLD AUTO: 1 %
ERYTHROCYTE [DISTWIDTH] IN BLOOD BY AUTOMATED COUNT: 12.6 % (ref 11.7–15.4)
GLOBULIN SER CALC-MCNC: 2.6 G/DL (ref 1.5–4.5)
GLUCOSE SERPL-MCNC: 96 MG/DL (ref 65–99)
HCT VFR BLD AUTO: 39.9 % (ref 34–46.6)
HGB BLD-MCNC: 12.8 G/DL (ref 11.1–15.9)
IMM GRANULOCYTES # BLD AUTO: 0 X10E3/UL (ref 0–0.1)
IMM GRANULOCYTES NFR BLD AUTO: 0 %
LYMPHOCYTES # BLD AUTO: 2.5 X10E3/UL (ref 0.7–3.1)
LYMPHOCYTES NFR BLD AUTO: 43 %
MCH RBC QN AUTO: 27.2 PG (ref 26.6–33)
MCHC RBC AUTO-ENTMCNC: 32.1 G/DL (ref 31.5–35.7)
MCV RBC AUTO: 85 FL (ref 79–97)
MONOCYTES # BLD AUTO: 1 X10E3/UL (ref 0.1–0.9)
MONOCYTES NFR BLD AUTO: 17 %
MORPHOLOGY BLD-IMP: ABNORMAL
NEUTROPHILS # BLD AUTO: 2.1 X10E3/UL (ref 1.4–7)
NEUTROPHILS NFR BLD AUTO: 37 %
PLATELET # BLD AUTO: 250 X10E3/UL (ref 150–450)
POTASSIUM SERPL-SCNC: 4.1 MMOL/L (ref 3.5–5.2)
PROT SERPL-MCNC: 6.6 G/DL (ref 6–8.5)
RBC # BLD AUTO: 4.71 X10E6/UL (ref 3.77–5.28)
SODIUM SERPL-SCNC: 143 MMOL/L (ref 134–144)
WBC # BLD AUTO: 5.8 X10E3/UL (ref 3.4–10.8)

## 2022-06-16 DIAGNOSIS — Z00.00 HEALTHCARE MAINTENANCE: Primary | ICD-10-CM

## 2022-06-27 ENCOUNTER — OFFICE VISIT (OUTPATIENT)
Dept: INTERNAL MEDICINE | Age: 32
End: 2022-06-27

## 2022-06-27 VITALS
TEMPERATURE: 97.1 F | DIASTOLIC BLOOD PRESSURE: 88 MMHG | SYSTOLIC BLOOD PRESSURE: 130 MMHG | BODY MASS INDEX: 27.83 KG/M2 | HEIGHT: 66 IN | OXYGEN SATURATION: 99 % | WEIGHT: 173.2 LBS | HEART RATE: 85 BPM

## 2022-06-27 DIAGNOSIS — E55.9 VITAMIN D DEFICIENCY: ICD-10-CM

## 2022-06-27 DIAGNOSIS — R56.9 SEIZURE: ICD-10-CM

## 2022-06-27 DIAGNOSIS — F41.9 ANXIETY: ICD-10-CM

## 2022-06-27 DIAGNOSIS — Z00.00 ENCOUNTER FOR ANNUAL PHYSICAL EXAM: Primary | ICD-10-CM

## 2022-06-27 PROBLEM — M54.2 NECK PAIN: Status: ACTIVE | Noted: 2019-05-06

## 2022-06-27 PROCEDURE — 99395 PREV VISIT EST AGE 18-39: CPT | Performed by: NURSE PRACTITIONER

## 2022-06-27 NOTE — PROGRESS NOTES
"[facility]  José Luismariia Dixon APRPOLO      Leonie Jason / 32 y.o. / female  06/27/2022      VITALS     Visit Vitals  /88 (Cuff Size: Adult)   Pulse 85   Temp 97.1 °F (36.2 °C) (Temporal)   Ht 167.6 cm (66\")   Wt 78.6 kg (173 lb 3.2 oz)   LMP 05/27/2022 (Approximate)   SpO2 99%   Breastfeeding No   BMI 27.96 kg/m²       BP Readings from Last 3 Encounters:   06/27/22 130/88   05/30/22 147/88   05/13/22 120/70     Wt Readings from Last 3 Encounters:   06/27/22 78.6 kg (173 lb 3.2 oz)   05/30/22 74.8 kg (165 lb)   05/13/22 75.8 kg (167 lb)      Body mass index is 27.96 kg/m².    MEDICATIONS     Current Outpatient Medications   Medication Sig Dispense Refill   • folic acid (FOLVITE) 400 MCG tablet Take 400 mcg by mouth Daily.     • Multiple Vitamins-Minerals (Hair Skin and Nails Formula) tablet Take 1 tablet by mouth.     • sertraline (ZOLOFT) 50 MG tablet Take 1 tablet by mouth Daily. 90 tablet 1   • Sprintec 28 0.25-35 MG-MCG per tablet TAKE ONE TABLET BY MOUTH DAILY 28 tablet 11     No current facility-administered medications for this visit.       _____________________________________________________________________________________    CHIEF COMPLAINT     Establish Care and Annual Exam      HISTORY OF PRESENT ILLNESS     Leonie presents for annual health maintenance visit.    · Last health maintenance visit: approximately 1 year ago  · General health: good  · Lifestyle:  · Attempting to lose weight?: Yes   · Diet: eats moderately healthy  · Exercise: exercises 4 days/week  · Tobacco: Never used   · Alcohol: occasional/infrequent  · Work: Part-time  · Reproductive health:  · Sexually active?: Yes   · Sexual problems?: No problems  · Concern for STD?: No    · Sees Gynecologist?: Yes   · Ana Luisa/Postmenopausal?: No   · Domestic abuse concerns: No   · Depression Screening:      PHQ-2/PHQ-9 Depression Screening 6/27/2022   Retired PHQ-9 Total Score -   Retired Total Score -   Little Interest or Pleasure in " Doing Things 0-->not at all   Feeling Down, Depressed or Hopeless 0-->not at all   PHQ-9: Brief Depression Severity Measure Score 0         PHQ-2: 0 (Not depressed)   PHQ-9: 0 (Negative screening for depression)    Patient Care Team:  José Luis Dixon APRN as PCP - General (Internal Medicine)  Sarwat Cadena MD as Consulting Physician (Obstetrics and Gynecology)  ______________________________________________________________________    ALLERGIES  Allergies   Allergen Reactions   • Amoxicillin Rash   • Penicillins Rash        PFSH:     The following portions of the patient's history were reviewed and updated as appropriate: Allergies / Current Medications / Past Medical History / Surgical History / Social History / Family History    PROBLEM LIST   Patient Active Problem List   Diagnosis   • Vitamin D deficiency   • Seizure (HCC)   • Anxiety   • Dehydration   • Acute diarrhea   • Neck pain       PAST MEDICAL HISTORY  Past Medical History:   Diagnosis Date   • Anxiety    • COVID    • Dysmenorrhea    • Seizure (HCC) 04/19/2019       SURGICAL HISTORY  Past Surgical History:   Procedure Laterality Date   • BREAST AUGMENTATION  08/2020   • EAR TUBES     • REFRACTIVE SURGERY         SOCIAL HISTORY  Social History     Socioeconomic History   • Marital status:    • Number of children: 1   Tobacco Use   • Smoking status: Never Smoker   • Smokeless tobacco: Never Used   Vaping Use   • Vaping Use: Never used   Substance and Sexual Activity   • Alcohol use: Yes     Comment: occasionally   • Drug use: Never   • Sexual activity: Yes     Partners: Male     Birth control/protection: OCP       FAMILY HISTORY  Family History   Problem Relation Age of Onset   • No Known Problems Mother    • Heart disease Father    • Heart attack Father    • Diabetes Father    • Hypertension Father    • Heart disease Maternal Grandfather    • Cancer Maternal Grandfather    • Pancreatic cancer Maternal Grandfather    • Hypertension Maternal Grandfather     • Brain cancer Maternal Aunt 60       IMMUNIZATION HISTORY  Immunization History   Administered Date(s) Administered   • PPD Test 07/10/2018       ______________________________________________________________________    REVIEW OF SYSTEMS     Review of Systems   Constitutional: Negative.    HENT: Negative.    Eyes: Negative.    Respiratory: Negative.    Cardiovascular: Negative.    Gastrointestinal: Negative.    Endocrine: Negative.    Genitourinary: Negative.    Musculoskeletal: Negative.    Skin: Negative.    Allergic/Immunologic: Negative.    Neurological: Negative.    Hematological: Negative.    Psychiatric/Behavioral: Negative.      PHYSICAL EXAMINATION     Physical Exam  Constitutional:       Appearance: Normal appearance.   HENT:      Head: Normocephalic and atraumatic.      Right Ear: Tympanic membrane normal.      Left Ear: Tympanic membrane normal.      Nose: Nose normal. No congestion.      Mouth/Throat:      Mouth: Mucous membranes are moist.      Pharynx: Oropharynx is clear.   Eyes:      Conjunctiva/sclera: Conjunctivae normal.      Pupils: Pupils are equal, round, and reactive to light.   Neck:      Thyroid: No thyromegaly.      Vascular: No carotid bruit.   Cardiovascular:      Rate and Rhythm: Normal rate and regular rhythm.      Pulses: Normal pulses.      Heart sounds: Normal heart sounds.   Pulmonary:      Effort: Pulmonary effort is normal.      Breath sounds: Normal breath sounds.   Abdominal:      General: There is no distension.      Palpations: Abdomen is soft.      Tenderness: There is no abdominal tenderness. There is no right CVA tenderness, left CVA tenderness or guarding.   Genitourinary:     Comments: Followed by OB/GYN  Musculoskeletal:         General: Normal range of motion.      Cervical back: Normal range of motion.      Right lower leg: No edema.      Left lower leg: No edema.   Lymphadenopathy:      Cervical: No cervical adenopathy.   Skin:     General: Skin is warm and dry.    Neurological:      Mental Status: She is alert and oriented to person, place, and time.      Cranial Nerves: No cranial nerve deficit.      Motor: No weakness.      Gait: Gait normal.      Deep Tendon Reflexes: Babinski sign absent on the right side.      Reflex Scores:       Patellar reflexes are 2+ on the right side and 2+ on the left side.  Psychiatric:         Mood and Affect: Mood normal.         Behavior: Behavior normal.         Thought Content: Thought content normal.         Judgment: Judgment normal.            REVIEWED DATA      Labs:    Lab Results   Component Value Date     06/16/2022    K 4.5 06/16/2022    CALCIUM 8.8 06/16/2022    AST 15 06/16/2022    ALT 10 06/16/2022    BUN 8 06/16/2022    CREATININE 0.66 06/16/2022    CREATININE 0.68 05/13/2022    CREATININE 0.81 02/05/2021    EGFRIFNONA 82 02/05/2021    EGFRIFAFRI 100 02/05/2021       Lab Results   Component Value Date    TSH 4.050 02/05/2021       Lab Results   Component Value Date     (H) 02/05/2021    HDL 59 02/05/2021    TRIG 213 (H) 02/05/2021    CHOLHDLRATIO 3.83 02/05/2021       Lab Results   Component Value Date    LVDL46OB 49.2 02/05/2021        Lab Results   Component Value Date    WBC 7.1 06/16/2022    HGB 12.7 06/16/2022    MCV 84 06/16/2022     06/16/2022       Lab Results   Component Value Date    PROTEIN Negative 02/05/2021    GLUCOSEU Negative 02/05/2021    BLOODU Negative 02/05/2021    NITRITEU Negative 02/05/2021    LEUKOCYTESUR Negative 02/05/2021          Lab Results   Component Value Date    HEPCVIRUSABY <0.1 02/05/2021       Imaging:        Medical Tests:        ASSESSMENT & PLAN     ANNUAL WELLNESS EXAM / PHYSICAL     Other medical problems addressed today:  Patient presents to establish care new provider in office along with annual physical.     She has had elevations in LDL last  with triglycerides of 213.  Eats moderately healthy, exercise 4 times weekly.    Lab Results   Component Value Date     CHLPL 226 (H) 02/05/2021    TRIG 213 (H) 02/05/2021    HDL 59 02/05/2021     (H) 02/05/2021     History of seizure in 2019, no reoccurrence, neurology has signed off, no longer on medication, last EEG unremarkable.    Anxiety well controlled with Zoloft 50 mg daily.    Followed by OB/GYN for routine well woman, on Sprintec with history of dysmenorrhea.    Summary/Discussion:     · Continue current medication for anxiety  · Last seizure in 2019, neurology signed off  · Decrease/eliminate soda, caffeine, alcohol and overall caloric intake. Reduce carbohydrates and sweets in diet.  Continue to improve dietary habits with lean proteins, fresh vegetables, fruits, and nuts. Improve aerobic exercise: walking/biking/swimming daily as tolerated, recommend 30 minutes/day at least 5 days/week.  · Up-to-date on all health maintenance except for vaccinations which she declines today  · Follow-up in 1 year for annual physical, earlier if needed    Next Appointment with me: Visit date not found    No follow-ups on file.      HEALTHCARE MAINTENANCE ISSUES     Cancer Screening:  · Colon: Initial/Next screening at age: 45  · Repeat colon cancer screening: N/A at this time  · Breast: Recommended monthly self exams; annual professional exam  · Mammogram: N/A at this time  · Cervical: 2 years  · Skin: Monthly self skin examination, annual exam by health professional  · Lung: Does not meet criteria for lung cancer screening.   · Other:    Screening Labs & Tests:  · Lab results reviewed & discussed with the patient or test orders placed today.  · EKG:  · CV Screening: Lipid panel  · DEXA (65+ or postmenopausal with risk factors):   · HEP C (If born [birth year]1446, or risk factors): Negative screen  · Other:     Immunization/Vaccinations (to be given today unless deferred by patient)  · Influenza: Patient deferred/declined flu vaccine (recommended annual vaccination)  · Hepatitis A: Declined by patient  · Tetanus/Pertussis: Declined  by patient  · Pneumovax: Declined by patient  · Shingles: Patient declines vaccine  · Other:     Lifestyle Counseling:  · Lifestyle Modifications: Attempt to lose weight and Improve dietary compliance  · Safety Issues: Always wear seatbelt, Avoid texting while driving   · Use sunscreen, regular skin examination  · Recommended annual dental/vision examination.  · Emotional/Stress/Sleep: Reviewed and  given when appropriate      Health Maintenance   Topic Date Due   • COVID-19 Vaccine (1) 06/29/2022 (Originally [birth year]20/1995)   • TDAP/TD VACCINES (1 - Tdap) 06/27/2023 (Originally 1/20/2009)   • INFLUENZA VACCINE  10/01/2022   • ANNUAL PHYSICAL  06/28/2023   • PAP SMEAR  07/20/2024   • HEPATITIS C SCREENING  Completed   • Pneumococcal Vaccine 0-64  Aged Out         Examiner was wearing KN95 mask, face shield and exam gloves during the entire duration of the visit. Patient was masked the entire time.   Minimum social distance of 6 ft maintained entire visit except if physical contact was necessary as documented.     **Dragon Disclaimer:   Much of this encounter note is an electronic transcription/translation of spoken language to printed text. The electronic translation of spoken language may permit erroneous, or at times, nonsensical words or phrases to be inadvertently transcribed. Although I have reviewed the note for such errors, some may still exist.

## 2022-06-29 LAB
25(OH)D3+25(OH)D2 SERPL-MCNC: 35.4 NG/ML (ref 30–100)
APPEARANCE UR: CLEAR
BACTERIA #/AREA URNS HPF: NORMAL /[HPF]
BACTERIA UR CULT: ABNORMAL
BACTERIA UR CULT: ABNORMAL
BILIRUB UR QL STRIP: NEGATIVE
CASTS URNS QL MICRO: NORMAL /LPF
CHOLEST SERPL-MCNC: 254 MG/DL (ref 100–199)
CHOLEST/HDLC SERPL: 3.7 RATIO (ref 0–4.4)
COLOR UR: YELLOW
EPI CELLS #/AREA URNS HPF: NORMAL /HPF (ref 0–10)
GLUCOSE UR QL STRIP: NEGATIVE
HBA1C MFR BLD: 5.1 % (ref 4.8–5.6)
HDLC SERPL-MCNC: 68 MG/DL
HGB UR QL STRIP: NEGATIVE
KETONES UR QL STRIP: NEGATIVE
LDLC SERPL CALC-MCNC: 141 MG/DL (ref 0–99)
LEUKOCYTE ESTERASE UR QL STRIP: ABNORMAL
MICRO URNS: ABNORMAL
NITRITE UR QL STRIP: NEGATIVE
PH UR STRIP: 6 [PH] (ref 5–7.5)
PROT UR QL STRIP: NEGATIVE
RBC #/AREA URNS HPF: NORMAL /HPF (ref 0–2)
SP GR UR STRIP: 1.01 (ref 1–1.03)
T4 FREE SERPL-MCNC: 1.02 NG/DL (ref 0.82–1.77)
TRIGL SERPL-MCNC: 253 MG/DL (ref 0–149)
TSH SERPL DL<=0.005 MIU/L-ACNC: 1.33 UIU/ML (ref 0.45–4.5)
URINALYSIS REFLEX: ABNORMAL
UROBILINOGEN UR STRIP-MCNC: 0.2 MG/DL (ref 0.2–1)
VLDLC SERPL CALC-MCNC: 45 MG/DL (ref 5–40)
WBC #/AREA URNS HPF: NORMAL /HPF (ref 0–5)

## 2022-11-08 DIAGNOSIS — F41.9 ANXIETY AND DEPRESSION: ICD-10-CM

## 2022-11-08 DIAGNOSIS — F32.A ANXIETY AND DEPRESSION: ICD-10-CM

## 2022-12-14 ENCOUNTER — TELEPHONE (OUTPATIENT)
Dept: OBSTETRICS AND GYNECOLOGY | Age: 32
End: 2022-12-14

## 2022-12-14 DIAGNOSIS — N94.6 DYSMENORRHEA: ICD-10-CM

## 2022-12-14 RX ORDER — NORGESTIMATE AND ETHINYL ESTRADIOL 0.25-0.035
1 KIT ORAL DAILY
Qty: 28 TABLET | Refills: 3 | Status: SHIPPED | OUTPATIENT
Start: 2022-12-14 | End: 2023-02-09 | Stop reason: SDUPTHER

## 2022-12-14 RX ORDER — NORGESTIMATE AND ETHINYL ESTRADIOL 0.25-0.035
1 KIT ORAL DAILY
Qty: 28 TABLET | Refills: 11 | Status: CANCELLED | OUTPATIENT
Start: 2022-12-14

## 2022-12-14 NOTE — TELEPHONE ENCOUNTER
Caller: Leonie Gomez    Relationship: Self    Best call back number: [phone]    Requested Prescriptions: SPRINTEC BIRTH CONTROL  Requested Prescriptions      No prescriptions requested or ordered in this encounter        Pharmacy where request should be sent: [facility]   #[id number]    Additional details provided by patient: PATIENT DID SCHEDULE FIRST AVAL APPOINTMENT FOR ANNUAL EXAM//PATIENT IS CURRENTLY OUT OF HER BIRTH CONTROL    Does the patient have less than a 3 day supply:  [x] Yes  [] No    Would you like a call back once the refill request has been completed: [x] Yes [] No    If the office needs to give you a call back, can they leave a voicemail: [x] Yes [] No    Shawna Diego Rep   12/14/22 09:22 EST         
Annual 2/23/22  
Pt notified  
Statement Selected

## 2023-02-09 ENCOUNTER — OFFICE VISIT (OUTPATIENT)
Dept: OBSTETRICS AND GYNECOLOGY | Age: 33
End: 2023-02-09
Payer: OTHER GOVERNMENT

## 2023-02-09 VITALS
DIASTOLIC BLOOD PRESSURE: 72 MMHG | HEIGHT: 66 IN | WEIGHT: 184 LBS | SYSTOLIC BLOOD PRESSURE: 118 MMHG | BODY MASS INDEX: 29.57 KG/M2

## 2023-02-09 DIAGNOSIS — Z30.41 ORAL CONTRACEPTIVE PILL SURVEILLANCE: ICD-10-CM

## 2023-02-09 DIAGNOSIS — N94.6 DYSMENORRHEA: ICD-10-CM

## 2023-02-09 DIAGNOSIS — Z01.419 WELL WOMAN EXAM WITH ROUTINE GYNECOLOGICAL EXAM: Primary | ICD-10-CM

## 2023-02-09 DIAGNOSIS — Z12.4 SCREENING FOR CERVICAL CANCER: ICD-10-CM

## 2023-02-09 PROCEDURE — 99395 PREV VISIT EST AGE 18-39: CPT | Performed by: NURSE PRACTITIONER

## 2023-02-09 RX ORDER — NORGESTIMATE AND ETHINYL ESTRADIOL 0.25-0.035
1 KIT ORAL DAILY
Qty: 84 TABLET | Refills: 4 | Status: SHIPPED | OUTPATIENT
Start: 2023-02-09

## 2023-02-09 NOTE — PROGRESS NOTES
Subjective     Chief Complaint   Patient presents with   • Gynecologic Exam     AE, last pap 2021 neg/hpv neg  Cc;  no problems       History of Present Illness    Leonie Gomez is a 33 y.o.  who presents for annual exam.    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         Current contraception: OCP (estrogen/progesterone)  History of abnormal Pap smear: no  Received Gardasil immunization: no  Perform regular self breast exam: yes - .  Family history of uterine or ovarian cancer: no  Family History of colon cancer: no  Family history of breast cancer: no    Mammogram: not indicated.  Colonoscopy: not indicated.  DEXA: not indicated.    Exercise: moderately active  Calcium/Vitamin D: adequate intake    The following portions of the patient's history were reviewed and updated as appropriate: allergies, current medications, past family history, past medical history, past social history, past surgical history and problem list.    Review of Systems   Constitutional: Negative.    Respiratory: Negative.    Cardiovascular: Negative.    Gastrointestinal: Negative.    Genitourinary: Negative.    Skin: Negative.    Psychiatric/Behavioral: Negative.            Objective   Physical Exam  Constitutional:       General: She is awake.      Appearance: Normal appearance. She is well-developed.   HENT:      Head: Normocephalic  and atraumatic.      Nose: Nose normal.   Neck:      Thyroid: No thyroid mass, thyromegaly or thyroid tenderness.   Cardiovascular:      Rate and Rhythm: Normal rate and regular rhythm.      Pulses: Normal pulses.      Heart sounds: Normal heart sounds.   Pulmonary:      Effort: Pulmonary effort is normal.      Breath sounds: Normal breath sounds.   Chest:   Breasts:     Breasts are symmetrical.      Right: Normal. No swelling, bleeding, inverted nipple, mass, nipple discharge, skin change or tenderness.      Left: Normal. No swelling, bleeding, inverted nipple, mass, nipple discharge, skin change or tenderness.   Abdominal:      General: Abdomen is flat. Bowel sounds are normal.      Palpations: Abdomen is soft.      Tenderness: There is no abdominal tenderness.   Genitourinary:     General: Normal vulva.      Labia:         Right: No rash, tenderness, lesion or injury.         Left: No rash, tenderness, lesion or injury.       Urethra: No prolapse, urethral pain, urethral swelling or urethral lesion.      Vagina: Normal. No signs of injury. No vaginal discharge, erythema, tenderness, bleeding, lesions or prolapsed vaginal walls.      Cervix: No discharge, friability, lesion, erythema or cervical bleeding.      Uterus: Normal. Not enlarged, not tender and no uterine prolapse.       Adnexa: Right adnexa normal and left adnexa normal.        Right: No mass, tenderness or fullness.          Left: No mass, tenderness or fullness.        Rectum: Normal. No mass.   Musculoskeletal:      Cervical back: Normal range of motion and neck supple.   Lymphadenopathy:      Upper Body:      Right upper body: No supraclavicular adenopathy.      Left upper body: No supraclavicular adenopathy.   Skin:     General: Skin is warm and dry.   Neurological:      General: No focal deficit present.      Mental Status: She is alert and oriented to person, place, and time.   Psychiatric:         Mood and Affect: Mood normal.         Behavior:  "Behavior normal. Behavior is cooperative.         Thought Content: Thought content normal.         Judgment: Judgment normal.         /72   Ht 167.6 cm (66\")   Wt 83.5 kg (184 lb)   LMP 02/06/2023   BMI 29.70 kg/m²     Assessment & Plan   Diagnoses and all orders for this visit:    1. Well woman exam with routine gynecological exam (Primary)    2. Screening for cervical cancer  -     IGP, Apt HPV,rfx 16 / 18,45    3. Oral contraceptive pill surveillance    4. Dysmenorrhea  -     norgestimate-ethinyl estradiol (Sprintec 28) 0.25-35 MG-MCG per tablet; Take 1 tablet by mouth Daily.  Dispense: 84 tablet; Refill: 4        All questions answered.  Breast self exam technique reviewed and patient encouraged to perform self-exam monthly.  Discussed healthy lifestyle modifications.  Recommended 30 minutes of aerobic exercise five times per week.  Discussed calcium needs to prevent osteoporosis.    -Pap today  -OCP's refilled  -F/u 1 year                "

## 2023-02-17 LAB
CYTOLOGIST CVX/VAG CYTO: NORMAL
CYTOLOGY CVX/VAG DOC CYTO: NORMAL
CYTOLOGY CVX/VAG DOC THIN PREP: NORMAL
DX ICD CODE: NORMAL
HIV 1 & 2 AB SER-IMP: NORMAL
HPV I/H RISK 4 DNA CVX QL PROBE+SIG AMP: NEGATIVE
Lab: NORMAL
OTHER STN SPEC: NORMAL
STAT OF ADQ CVX/VAG CYTO-IMP: NORMAL

## 2023-03-02 DIAGNOSIS — F32.A ANXIETY AND DEPRESSION: ICD-10-CM

## 2023-03-02 DIAGNOSIS — F41.9 ANXIETY AND DEPRESSION: ICD-10-CM

## 2024-02-13 ENCOUNTER — OFFICE VISIT (OUTPATIENT)
Dept: OBSTETRICS AND GYNECOLOGY | Age: 34
End: 2024-02-13
Payer: OTHER GOVERNMENT

## 2024-02-13 VITALS
HEIGHT: 66 IN | SYSTOLIC BLOOD PRESSURE: 122 MMHG | DIASTOLIC BLOOD PRESSURE: 74 MMHG | BODY MASS INDEX: 29.89 KG/M2 | WEIGHT: 186 LBS

## 2024-02-13 DIAGNOSIS — Z01.419 WELL WOMAN EXAM WITH ROUTINE GYNECOLOGICAL EXAM: Primary | ICD-10-CM

## 2024-02-13 DIAGNOSIS — N94.6 DYSMENORRHEA: ICD-10-CM

## 2024-02-13 DIAGNOSIS — Z30.41 ORAL CONTRACEPTIVE PILL SURVEILLANCE: ICD-10-CM

## 2024-02-13 DIAGNOSIS — Z12.4 SCREENING FOR CERVICAL CANCER: ICD-10-CM

## 2024-02-13 RX ORDER — NORGESTIMATE AND ETHINYL ESTRADIOL 0.25-0.035
1 KIT ORAL DAILY
Qty: 84 TABLET | Refills: 4 | Status: SHIPPED | OUTPATIENT
Start: 2024-02-13

## 2024-03-16 ENCOUNTER — HOSPITAL ENCOUNTER (OUTPATIENT)
Facility: HOSPITAL | Age: 34
Discharge: HOME | End: 2024-03-16
Attending: EMERGENCY MEDICINE | Admitting: EMERGENCY MEDICINE
Payer: OTHER GOVERNMENT

## 2024-03-16 VITALS
HEART RATE: 93 BPM | RESPIRATION RATE: 18 BRPM | TEMPERATURE: 98.3 F | OXYGEN SATURATION: 94 % | WEIGHT: 183 LBS | SYSTOLIC BLOOD PRESSURE: 149 MMHG | DIASTOLIC BLOOD PRESSURE: 104 MMHG | BODY MASS INDEX: 29.41 KG/M2 | HEIGHT: 66 IN

## 2024-03-16 DIAGNOSIS — B35.4 TINEA CORPORIS: Primary | ICD-10-CM

## 2024-03-16 PROCEDURE — 99213 OFFICE O/P EST LOW 20 MIN: CPT | Performed by: NURSE PRACTITIONER

## 2024-03-16 PROCEDURE — G0463 HOSPITAL OUTPT CLINIC VISIT: HCPCS | Performed by: NURSE PRACTITIONER

## 2024-03-16 RX ORDER — CLOTRIMAZOLE AND BETAMETHASONE DIPROPIONATE 10; .64 MG/G; MG/G
1 CREAM TOPICAL 2 TIMES DAILY
Qty: 30 G | Refills: 0 | Status: SHIPPED | OUTPATIENT
Start: 2024-03-16

## 2024-03-16 NOTE — FSED PROVIDER NOTE
Subjective   History of Present Illness  34-year-old  overweight female patient presented to the emergency department via private vehicle with a chief complaint of a rash on the posterior lower legs.  Patient states this rash started approximately 1 week ago and has progressively gotten worse.  She states that she has tried over-the-counter creams and ointments along with Benadryl but it has not helped.  She states that it does itch and may be some slight burning but no significant pain or neuralgia.    History provided by:  Medical records and patient      Review of Systems   Constitutional: Negative.    HENT: Negative.     Respiratory: Negative.     Cardiovascular: Negative.    Gastrointestinal: Negative.    Genitourinary: Negative.    Musculoskeletal: Negative.    Skin:  Positive for rash. Negative for color change, pallor and wound.   Neurological: Negative.    Psychiatric/Behavioral: Negative.     All other systems reviewed and are negative.      Past Medical History:   Diagnosis Date    Anxiety     COVID     Dysmenorrhea     Seizure 04/19/2019       Allergies   Allergen Reactions    Amoxicillin Rash    Penicillins Rash       Past Surgical History:   Procedure Laterality Date    BREAST AUGMENTATION  08/2020    EAR TUBES      REFRACTIVE SURGERY         Family History   Problem Relation Age of Onset    No Known Problems Mother     Heart disease Father     Heart attack Father     Diabetes Father     Hypertension Father     Heart disease Maternal Grandfather     Cancer Maternal Grandfather     Pancreatic cancer Maternal Grandfather     Hypertension Maternal Grandfather     Brain cancer Maternal Aunt 60       Social History     Socioeconomic History    Marital status:     Number of children: 1   Tobacco Use    Smoking status: Never    Smokeless tobacco: Never   Vaping Use    Vaping status: Never Used   Substance and Sexual Activity    Alcohol use: Yes     Comment: occasionally    Drug use: Never     Sexual activity: Yes     Partners: Male     Birth control/protection: OCP           Objective   Physical Exam  Vitals and nursing note reviewed.   Constitutional:       General: She is not in acute distress.     Appearance: Normal appearance. She is normal weight. She is not ill-appearing, toxic-appearing or diaphoretic.   HENT:      Head: Normocephalic and atraumatic.      Right Ear: External ear normal.      Left Ear: External ear normal.      Nose: Nose normal.      Mouth/Throat:      Mouth: Mucous membranes are moist.      Pharynx: Oropharynx is clear.   Eyes:      Extraocular Movements: Extraocular movements intact.      Conjunctiva/sclera: Conjunctivae normal.      Pupils: Pupils are equal, round, and reactive to light.   Cardiovascular:      Rate and Rhythm: Normal rate and regular rhythm.      Pulses: Normal pulses.      Heart sounds: Normal heart sounds.   Pulmonary:      Effort: Pulmonary effort is normal.      Breath sounds: Normal breath sounds.   Abdominal:      General: Bowel sounds are normal.      Palpations: Abdomen is soft.   Musculoskeletal:         General: Normal range of motion.      Cervical back: Normal range of motion and neck supple.   Skin:     General: Skin is warm and dry.      Capillary Refill: Capillary refill takes less than 2 seconds.      Findings: Erythema and rash present.          Neurological:      General: No focal deficit present.      Mental Status: She is alert and oriented to person, place, and time.      Cranial Nerves: Cranial nerves 2-12 are intact.      Sensory: Sensation is intact.      Motor: Motor function is intact.      Coordination: Coordination is intact.      Gait: Gait is intact.   Psychiatric:         Attention and Perception: Attention and perception normal.         Mood and Affect: Mood normal.         Speech: Speech normal.         Behavior: Behavior normal. Behavior is cooperative.         Thought Content: Thought content normal.         Cognition and  Memory: Cognition and memory normal.         Judgment: Judgment normal.         Procedures           ED Course                                           Medical Decision Making  Differential diagnosis includes cellulitis, impetigo, tinea, and candidal infection.    Discussed assessment findings with patient.  Patient appears to have a fungal skin infection.  We will prescribe an antifungal with steroid to decrease the inflammation and cure at the fungal infection.  I have instructed her to follow-up with her primary care.  Patient should return if symptoms become severe.  Patient understands and agrees to discharge plan.    Problems Addressed:  Tinea corporis: acute illness or injury    Risk  Prescription drug management.        Final diagnoses:   Tinea corporis       ED Disposition  ED Disposition       ED Disposition   Discharge    Condition   Stable    Comment   --               José Luis Dixon, APRN  [address]  [facility] [address]  [facility] [id number] [phone]    Schedule an appointment as soon as possible for a visit in 1 week      [facility]  [id number] [place] [id number]    If symptoms worsen         Medication List        New Prescriptions      clotrimazole-betamethasone 1-0.05 % cream  Commonly known as: LOTRISONE  Apply 1 Application topically to the appropriate area as directed 2 (Two) Times a Day.               Where to Get Your Medications        These medications were sent to [facility] [id number] - [place], [place] - [address] AT [facility] [address] - [phone]  - [phone]   [address] [facility], [facility] [address]      Phone: [phone]   clotrimazole-betamethasone 1-0.05 % cream

## 2024-07-11 DIAGNOSIS — F32.A ANXIETY AND DEPRESSION: ICD-10-CM

## 2024-07-11 DIAGNOSIS — F41.9 ANXIETY AND DEPRESSION: ICD-10-CM

## 2024-07-16 ENCOUNTER — OFFICE VISIT (OUTPATIENT)
Dept: INTERNAL MEDICINE | Age: 34
End: 2024-07-16
Payer: COMMERCIAL

## 2024-07-16 VITALS
TEMPERATURE: 97.9 F | DIASTOLIC BLOOD PRESSURE: 80 MMHG | HEART RATE: 90 BPM | HEIGHT: 66 IN | BODY MASS INDEX: 30.6 KG/M2 | OXYGEN SATURATION: 99 % | WEIGHT: 190.4 LBS | SYSTOLIC BLOOD PRESSURE: 134 MMHG

## 2024-07-16 DIAGNOSIS — F41.9 ANXIETY AND DEPRESSION: ICD-10-CM

## 2024-07-16 DIAGNOSIS — F32.A ANXIETY AND DEPRESSION: ICD-10-CM

## 2024-07-16 DIAGNOSIS — Z00.00 ENCOUNTER FOR ANNUAL PHYSICAL EXAM: Primary | ICD-10-CM

## 2024-07-16 PROCEDURE — 99395 PREV VISIT EST AGE 18-39: CPT | Performed by: NURSE PRACTITIONER

## 2024-07-16 NOTE — PROGRESS NOTES
"[facility] INTERNAL MEDICINE  José Luis PAU DixonPOLO      Leonie Jason / 34 y.o. / female  2024      VITALS     Visit Vitals  /80   Pulse 90   Temp 97.9 °F (36.6 °C) (Temporal)   Ht 167.6 cm (66\")   Wt 86.4 kg (190 lb 6.4 oz)   LMP 07/15/2024 (Exact Date)   SpO2 99%   BMI 30.73 kg/m²       BP Readings from Last 3 Encounters:   24 134/80   24 (!) 149/104   24 122/74     Wt Readings from Last 3 Encounters:   24 86.4 kg (190 lb 6.4 oz)   24 83 kg (183 lb)   24 84.4 kg (186 lb)      Body mass index is 30.73 kg/m².    MEDICATIONS     Current Outpatient Medications   Medication Sig Dispense Refill    norgestimate-ethinyl estradiol (Sprintec 28) 0.25-35 MG-MCG per tablet Take 1 tablet by mouth Daily. 84 tablet 4    sertraline (ZOLOFT) 50 MG tablet Take 1 tablet by mouth Daily. 90 tablet 3     No current facility-administered medications for this visit.       _____________________________________________________________________________________    CHIEF COMPLAINT     Annual Exam      HISTORY OF PRESENT ILLNESS     Leonie presents for annual health maintenance visit.    Last health maintenance visit: approximately 1 year ago  General health: excellent  Lifestyle:  Attempting to lose weight?: Yes   Diet: eats moderately healthy  Exercise: exercises 1-2 days weekly  Tobacco: Never used   Alcohol: occasional/infrequent  Work: Full-time and Student  Reproductive health:  Sexually active?: Yes   Sexual problems?: No problems  Concern for STD?: No    Sees Gynecologist?: Yes   Ana Luisa/Postmenopausal?: No   Domestic abuse concerns: No   Depression Screenin/16/2024     9:27 AM   PHQ-2/PHQ-9 Depression Screening   Little Interest or Pleasure in Doing Things 0-->not at all   Feeling Down, Depressed or Hopeless 0-->not at all   PHQ-9: Brief Depression Severity Measure Score 0         PHQ-2: 0 (Not depressed)   PHQ-9: 0 (Negative screening for depression)    Patient Care Team:  Zack, " SRINI Ordonez APRN as PCP - General (Internal Medicine)  Sarwat Cadena MD as Consulting Physician (Obstetrics and Gynecology)  ______________________________________________________________________    ALLERGIES  Allergies   Allergen Reactions    Amoxicillin Rash    Penicillins Rash        PFSH:     The following portions of the patient's history were reviewed and updated as appropriate: Allergies / Current Medications / Past Medical History / Surgical History / Social History / Family History    PROBLEM LIST   Patient Active Problem List   Diagnosis    Vitamin D deficiency    Seizure    Anxiety    Dehydration    Acute diarrhea    Neck pain       PAST MEDICAL HISTORY  Past Medical History:   Diagnosis Date    Anxiety     COVID     Dysmenorrhea     Seizure 04/19/2019       SURGICAL HISTORY  Past Surgical History:   Procedure Laterality Date    BREAST AUGMENTATION  08/2020    EAR TUBES      REFRACTIVE SURGERY         SOCIAL HISTORY  Social History     Socioeconomic History    Marital status:     Number of children: 1   Tobacco Use    Smoking status: Never    Smokeless tobacco: Never   Vaping Use    Vaping status: Never Used   Substance and Sexual Activity    Alcohol use: Yes     Comment: occasionally    Drug use: Never    Sexual activity: Yes     Partners: Male     Birth control/protection: OCP       FAMILY HISTORY  Family History   Problem Relation Age of Onset    No Known Problems Mother     Heart disease Father     Heart attack Father     Diabetes Father     Hypertension Father     Heart disease Maternal Grandfather     Cancer Maternal Grandfather     Pancreatic cancer Maternal Grandfather     Hypertension Maternal Grandfather     Brain cancer Maternal Aunt 60       IMMUNIZATION HISTORY  Immunization History   Administered Date(s) Administered    PPD Test 07/10/2018    Tdap 07/14/2023       ______________________________________________________________________    REVIEW OF SYSTEMS     Review of  Systems  Constitutional: Negative.    HENT: Negative.    Eyes: Negative.    Respiratory: Negative.    Cardiovascular: Negative.    Gastrointestinal: Negative.    Endocrine: Negative.    Genitourinary: Negative.    Musculoskeletal: Negative.    Skin: Negative.    Allergic/Immunologic: Negative.    Neurological: Negative.    Hematological: Negative.    Psychiatric/Behavioral: Negative.      PHYSICAL EXAMINATION     Physical Exam  Constitutional:       Appearance: Normal appearance.   HENT:      Head: Normocephalic and atraumatic.      Right Ear: Tympanic membrane normal.      Left Ear: Tympanic membrane normal.      Nose: Nose normal. No congestion.      Mouth/Throat:      Mouth: Mucous membranes are moist.      Pharynx: Oropharynx is clear.   Eyes:      Conjunctiva/sclera: Conjunctivae normal.      Pupils: Pupils are equal, round, and reactive to light.   Neck:      Thyroid: No thyromegaly.      Vascular: No carotid bruit.   Cardiovascular:      Rate and Rhythm: Normal rate and regular rhythm.      Pulses: Normal pulses.      Heart sounds: Normal heart sounds.   Pulmonary:      Effort: Pulmonary effort is normal.      Breath sounds: Normal breath sounds.   Abdominal:      General: There is no distension.      Palpations: Abdomen is soft.      Tenderness: There is no abdominal tenderness. There is no right CVA tenderness, left CVA tenderness or guarding.   Genitourinary:     Comments: Followed by OB/GYN  Musculoskeletal:         General: Normal range of motion.      Cervical back: Normal range of motion.      Right lower leg: No edema.      Left lower leg: No edema.   Lymphadenopathy:      Cervical: No cervical adenopathy.   Skin:     General: Skin is warm and dry.   Neurological:      Mental Status: She is alert and oriented to person, place, and time.      Cranial Nerves: No cranial nerve deficit.      Motor: No weakness.      Gait: Gait normal.      Deep Tendon Reflexes: Babinski sign absent on the right side.       Reflex Scores:       Patellar reflexes are 2+ on the right side and 2+ on the left side.  Psychiatric:         Mood and Affect: Mood normal.         Behavior: Behavior normal.         Thought Content: Thought content normal.         Judgment: Judgment normal.     REVIEWED DATA      Labs:    Lab Results   Component Value Date     07/14/2023    K 4.6 07/14/2023    CALCIUM 9.2 07/14/2023    AST 9 07/14/2023    ALT 10 07/14/2023    BUN 11 07/14/2023    CREATININE 0.70 07/14/2023    CREATININE 0.66 06/16/2022    CREATININE 0.68 05/13/2022    EGFRIFNONA 82 02/05/2021    EGFRIFAFRI 100 02/05/2021       Lab Results   Component Value Date    HGBA1C 5.00 07/14/2023    HGBA1C 5.1 06/27/2022    TSH 2.590 07/14/2023    FREET4 1.02 07/14/2023       Lab Results   Component Value Date     (H) 07/14/2023    HDL 57 07/14/2023    TRIG 264 (H) 07/14/2023    CHOLHDLRATIO 4.02 07/14/2023       Lab Results   Component Value Date    LSPV20MU 35.3 07/14/2023        Lab Results   Component Value Date    WBC 6.56 07/14/2023    HGB 13.1 07/14/2023    MCV 83.4 07/14/2023     07/14/2023       Lab Results   Component Value Date    PROTEIN Negative 07/14/2023    GLUCOSEU Negative 07/14/2023    BLOODU Negative 07/14/2023    NITRITEU Negative 07/14/2023    LEUKOCYTESUR Negative 07/14/2023          Lab Results   Component Value Date    HEPCVIRUSABY <0.1 02/05/2021       Imaging:        Medical Tests:        ASSESSMENT & PLAN     ANNUAL WELLNESS EXAM / PHYSICAL     Other medical problems addressed today:  Well woman exam with ALONA Funez along with birth control surveillance and dysmenorrhea February 13, 2024.  Continued on Sprintec for dysmenorrhea.  Pap smear benign February 13, 2024.    History of seizure in 2019, no reoccurrence, neurology has signed off, no longer on medication, last EEG unremarkable.     Anxiety well controlled with Zoloft 50 mg daily.     Summary/Discussion:     Annual fasting labs.  Blood pressure  high end.  Will check blood pressure at work 2-3 times weekly and update me via [employer]    Next Appointment with me: Visit date not found    Return in about 1 year (around 7/16/2025) for Annual physical.      HEALTHCARE MAINTENANCE ISSUES     Cancer Screening:  Colon: Initial/Next screening at age: 45  Repeat colon cancer screening: N/A at this time  Breast: Recommended monthly self exams; annual professional exam  Mammogram: N/A at this time  Cervical: 1 year  Skin: Monthly self skin examination, annual exam by health professional  Lung: Does not meet criteria for lung cancer screening.   Other:    Screening Labs & Tests:  Lab results reviewed & discussed with the patient or test orders placed today.  EKG:  CV Screening: Lipid panel  DEXA (65+ or postmenopausal with risk factors):   HEP C (If born 0883-7[birth year], or risk factors): Negative screen  Other:     Immunization/Vaccinations (to be given today unless deferred by patient)  Influenza: Recommended annual influenza vaccine  Hepatitis A: Recommended here or at pharmacy  Tetanus/Pertussis: Up to date  Pneumovax: Not needed at this time  Shingles: Not needed at this time  Other:     Lifestyle Counseling:  Lifestyle Modifications: Improve dietary compliance and Increase intensity/regularity of aerobic exercise  Safety Issues: Always wear seatbelt, Avoid texting while driving   Use sunscreen, regular skin examination  Recommended annual dental/vision examination.  Emotional/Stress/Sleep: Reviewed and  given when appropriate      Health Maintenance   Topic Date Due    COVID-19 Vaccine (1 - 2023-24 season) 07/18/2024 (Originally 9/1/2023)    INFLUENZA VACCINE  08/01/2024    ANNUAL PHYSICAL  07/16/2025    BMI FOLLOWUP  07/16/2025    PAP SMEAR  02/13/2027    TDAP/TD VACCINES (2 - Td or Tdap) 07/14/2033    HEPATITIS C SCREENING  Completed    Pneumococcal Vaccine 0-64  Aged Out     *Dragon dictation used for documentation.

## 2024-07-17 LAB
ALBUMIN SERPL-MCNC: 4.2 G/DL (ref 3.5–5.2)
ALBUMIN/GLOB SERPL: 1.5 G/DL
ALP SERPL-CCNC: 77 U/L (ref 39–117)
ALT SERPL-CCNC: 10 U/L (ref 1–33)
APPEARANCE UR: CLEAR
AST SERPL-CCNC: 14 U/L (ref 1–32)
BACTERIA #/AREA URNS HPF: ABNORMAL /[HPF]
BASOPHILS # BLD AUTO: 0.03 10*3/MM3 (ref 0–0.2)
BASOPHILS NFR BLD AUTO: 0.5 % (ref 0–1.5)
BILIRUB SERPL-MCNC: 0.3 MG/DL (ref 0–1.2)
BILIRUB UR QL STRIP: NEGATIVE
BUN SERPL-MCNC: 8 MG/DL (ref 6–20)
BUN/CREAT SERPL: 11 (ref 7–25)
CALCIUM SERPL-MCNC: 8.9 MG/DL (ref 8.6–10.5)
CASTS URNS QL MICRO: ABNORMAL /LPF
CHLORIDE SERPL-SCNC: 105 MMOL/L (ref 98–107)
CHOLEST SERPL-MCNC: 225 MG/DL (ref 0–200)
CHOLEST/HDLC SERPL: 3.95 {RATIO}
CO2 SERPL-SCNC: 24.4 MMOL/L (ref 22–29)
COLOR UR: YELLOW
CREAT SERPL-MCNC: 0.73 MG/DL (ref 0.57–1)
EGFRCR SERPLBLD CKD-EPI 2021: 110.8 ML/MIN/1.73
EOSINOPHIL # BLD AUTO: 0.09 10*3/MM3 (ref 0–0.4)
EOSINOPHIL NFR BLD AUTO: 1.4 % (ref 0.3–6.2)
EPI CELLS #/AREA URNS HPF: ABNORMAL /HPF (ref 0–10)
ERYTHROCYTE [DISTWIDTH] IN BLOOD BY AUTOMATED COUNT: 12.3 % (ref 12.3–15.4)
GLOBULIN SER CALC-MCNC: 2.8 GM/DL
GLUCOSE SERPL-MCNC: 82 MG/DL (ref 65–99)
GLUCOSE UR QL STRIP: NEGATIVE
HBA1C MFR BLD: 5.1 % (ref 4.8–5.6)
HCT VFR BLD AUTO: 42.3 % (ref 34–46.6)
HDLC SERPL-MCNC: 57 MG/DL (ref 40–60)
HGB BLD-MCNC: 14 G/DL (ref 12–15.9)
HGB UR QL STRIP: NEGATIVE
IMM GRANULOCYTES # BLD AUTO: 0.02 10*3/MM3 (ref 0–0.05)
IMM GRANULOCYTES NFR BLD AUTO: 0.3 % (ref 0–0.5)
KETONES UR QL STRIP: NEGATIVE
LDLC SERPL CALC-MCNC: 123 MG/DL (ref 0–100)
LEUKOCYTE ESTERASE UR QL STRIP: NEGATIVE
LYMPHOCYTES # BLD AUTO: 1.95 10*3/MM3 (ref 0.7–3.1)
LYMPHOCYTES NFR BLD AUTO: 30.6 % (ref 19.6–45.3)
MCH RBC QN AUTO: 27.9 PG (ref 26.6–33)
MCHC RBC AUTO-ENTMCNC: 33.1 G/DL (ref 31.5–35.7)
MCV RBC AUTO: 84.4 FL (ref 79–97)
MICRO URNS: NORMAL
MICRO URNS: NORMAL
MONOCYTES # BLD AUTO: 0.47 10*3/MM3 (ref 0.1–0.9)
MONOCYTES NFR BLD AUTO: 7.4 % (ref 5–12)
NEUTROPHILS # BLD AUTO: 3.81 10*3/MM3 (ref 1.7–7)
NEUTROPHILS NFR BLD AUTO: 59.8 % (ref 42.7–76)
NITRITE UR QL STRIP: NEGATIVE
NRBC BLD AUTO-RTO: 0 /100 WBC (ref 0–0.2)
PH UR STRIP: 5.5 [PH] (ref 5–7.5)
PLATELET # BLD AUTO: 295 10*3/MM3 (ref 140–450)
POTASSIUM SERPL-SCNC: 4.4 MMOL/L (ref 3.5–5.2)
PROT SERPL-MCNC: 7 G/DL (ref 6–8.5)
PROT UR QL STRIP: NORMAL
RBC # BLD AUTO: 5.01 10*6/MM3 (ref 3.77–5.28)
RBC #/AREA URNS HPF: ABNORMAL /HPF (ref 0–2)
SODIUM SERPL-SCNC: 140 MMOL/L (ref 136–145)
SP GR UR STRIP: 1.02 (ref 1–1.03)
T4 FREE SERPL-MCNC: 0.99 NG/DL (ref 0.92–1.68)
TRIGL SERPL-MCNC: 260 MG/DL (ref 0–150)
TSH SERPL DL<=0.005 MIU/L-ACNC: 1.15 UIU/ML (ref 0.27–4.2)
URINALYSIS REFLEX: NORMAL
UROBILINOGEN UR STRIP-MCNC: 0.2 MG/DL (ref 0.2–1)
VLDLC SERPL CALC-MCNC: 45 MG/DL (ref 5–40)
WBC # BLD AUTO: 6.37 10*3/MM3 (ref 3.4–10.8)
WBC #/AREA URNS HPF: ABNORMAL /HPF (ref 0–5)
YEAST #/AREA URNS HPF: PRESENT /[HPF]

## 2024-09-06 ENCOUNTER — OFFICE VISIT (OUTPATIENT)
Dept: INTERNAL MEDICINE | Age: 34
End: 2024-09-06
Payer: OTHER GOVERNMENT

## 2024-09-06 VITALS
BODY MASS INDEX: 31.82 KG/M2 | HEART RATE: 116 BPM | SYSTOLIC BLOOD PRESSURE: 130 MMHG | OXYGEN SATURATION: 97 % | WEIGHT: 198 LBS | DIASTOLIC BLOOD PRESSURE: 78 MMHG | HEIGHT: 66 IN | TEMPERATURE: 99.6 F | RESPIRATION RATE: 18 BRPM

## 2024-09-06 DIAGNOSIS — J20.9 ACUTE BRONCHITIS, UNSPECIFIED ORGANISM: Primary | ICD-10-CM

## 2024-09-06 DIAGNOSIS — R50.9 FEVER, UNSPECIFIED FEVER CAUSE: ICD-10-CM

## 2024-09-06 LAB
EXPIRATION DATE: NORMAL
FLUAV AG UPPER RESP QL IA.RAPID: NOT DETECTED
FLUBV AG UPPER RESP QL IA.RAPID: NOT DETECTED
INTERNAL CONTROL: NORMAL
Lab: NORMAL
SARS-COV-2 AG UPPER RESP QL IA.RAPID: NOT DETECTED

## 2024-09-06 PROCEDURE — 87428 SARSCOV & INF VIR A&B AG IA: CPT | Performed by: PHYSICIAN ASSISTANT

## 2024-09-06 PROCEDURE — 99213 OFFICE O/P EST LOW 20 MIN: CPT | Performed by: PHYSICIAN ASSISTANT

## 2024-09-06 RX ORDER — AZITHROMYCIN 250 MG/1
TABLET, FILM COATED ORAL
Qty: 6 TABLET | Refills: 0 | Status: SHIPPED | OUTPATIENT
Start: 2024-09-06

## 2024-09-06 RX ORDER — ALBUTEROL SULFATE 90 UG/1
2 AEROSOL, METERED RESPIRATORY (INHALATION) EVERY 4 HOURS PRN
Qty: 8.5 G | Refills: 0 | Status: SHIPPED | OUTPATIENT
Start: 2024-09-06

## 2024-09-06 NOTE — LETTER
September 6, 2024     Patient: Leonie Gomez   YOB: 1990   Date of Visit: 9/6/2024       To Whom It May Concern:    It is my medical opinion that Leonie Gomez may return to work on Tuesday 9/10/2024 due to diagnosed illness.    Please contact me for further details if necessary.          Sincerely,          Dmitry Carrion PA-C    CC: No Recipients

## 2024-09-06 NOTE — PROGRESS NOTES
"    I N T E R N A L  M E D I C I N E    SHANTEL OREILLY PA-C      ENCOUNTER DATE:  09/06/2024    Leonie Gomez / 34 y.o. / female    CHIEF COMPLAINT / REASON FOR OFFICE VISIT     Fever (She did get tested for COVID on Monday and it was negative at [facility]; started Saturday her symptoms; she was taking dayquil ), Cough, Diarrhea, Nasal Congestion (sinus), and Dizziness      ASSESSMENT & PLAN     1. Acute bronchitis, unspecified organism    2. Fever, unspecified fever cause      Orders Placed This Encounter   Procedures    POCT SARS-CoV-2 Antigen PAULINE + Flu     New Medications Ordered This Visit   Medications    azithromycin (Zithromax Z-Migel) 250 MG tablet     Sig: Take 2 tablets by mouth on day 1, then 1 tablet daily on days 2-5     Dispense:  6 tablet     Refill:  0    albuterol sulfate  (90 Base) MCG/ACT inhaler     Sig: Inhale 2 puffs Every 4 (Four) Hours As Needed for Wheezing (cough).     Dispense:  8.5 g     Refill:  0       SUMMARY/DISCUSSION  SARS-COV-2 and influenza A/B screens both negative in office on today.  Acute bronchitis diagnosed: Start azithromycin Z-Migel 250 mg tablets to be taken as prescribed.    Cough: Albuterol sulfate inhaler 2 puffs every 4-6 hours as needed for cough and wheeze.  Fever: Alternate Tylenol and ibuprofen every 4 hours as needed.  Return if symptoms do not improve after 3 to 5 days.  Go to ER if symptoms become joint with shortness of breath or chest pain.         Next Appointment with me:   Return if symptoms worsen or fail to improve. And, for Follow-up with José Luis Dixon, SRINI, APRN.        VITAL SIGNS     Vitals:    09/06/24 0917   BP: 130/78   BP Location: Left arm   Patient Position: Sitting   Cuff Size: Large Adult   Pulse: 116   Resp: 18   Temp: 99.6 °F (37.6 °C)   TempSrc: Temporal   SpO2: 97%   Weight: 89.8 kg (198 lb)   Height: 167.6 cm (65.98\")       BP Readings from Last 3 Encounters:   09/06/24 130/78   09/02/24 148/94   07/16/24 134/80       Wt " Readings from Last 3 Encounters:   09/06/24 89.8 kg (198 lb)   09/02/24 83.9 kg (185 lb)   07/16/24 86.4 kg (190 lb 6.4 oz)     Body mass index is 31.97 kg/m².         No data to display                  MEDICATIONS AT THE TIME OF OFFICE VISIT     Current Outpatient Medications on File Prior to Visit   Medication Sig    norgestimate-ethinyl estradiol (Sprintec 28) 0.25-35 MG-MCG per tablet Take 1 tablet by mouth Daily.    sertraline (ZOLOFT) 50 MG tablet Take 1 tablet by mouth Daily.     No current facility-administered medications on file prior to visit.          HISTORY OF PRESENT ILLNESS     Pt is a 33y/o wf with depression who presents with cold symptoms and fatigue.     She reports that her symptoms of sore throat and body aches began on Saturday. On Sunday she began to experience symptoms of a pressure headache to her frontal forehead area. On Monday after having a fever > 100 degrees, she went to the Urgent Care, and was negative for both COVID and Influenza. On Tuesday evening her fever broke, so she attempted to return to work Wednesday, but was still extremely fatigued. On yesterday she began to experience both hot and cold flashes, and had a measured temperature of 100.4 degrees. He temperature temporarily improved with Nyquil, but again read at 100.6 after her Nyquil wore off. Her cough is throughout the day, worsens at night, causing her to expel dark brown mucus. She further admits heart palpitations for the last several days.  She denies chills, nausea, vomiting, diarrhea, chest pain, or shortness of breath.  She has attempted both Dayquil and Nyquil since her symptoms initially began with temporary success.     POCT SARS-COV-2 and Influenza A/B both negative on today.       REVIEW OF SYSTEMS   Review of Systems   Constitutional:  Positive for fatigue and fever. Negative for activity change, appetite change and chills.   HENT:  Positive for congestion, postnasal drip, rhinorrhea, sinus pressure,  sneezing and sore throat.    Eyes: Negative.    Respiratory:  Positive for cough (Productive).    Cardiovascular:  Positive for palpitations.   Gastrointestinal: Negative.    Endocrine: Negative.    Genitourinary: Negative.               PHYSICAL EXAMINATION     Physical Exam  Vitals and nursing note reviewed.   Constitutional:       General: She is not in acute distress.     Appearance: Normal appearance. She is not ill-appearing or toxic-appearing.   HENT:      Head: Normocephalic and atraumatic.      Right Ear: Tympanic membrane, ear canal and external ear normal. There is no impacted cerumen.      Left Ear: Tympanic membrane, ear canal and external ear normal.      Nose: No congestion.      Right Sinus: Frontal sinus tenderness present. No maxillary sinus tenderness.      Left Sinus: Frontal sinus tenderness present. No maxillary sinus tenderness.      Mouth/Throat:      Mouth: Mucous membranes are moist.      Pharynx: Oropharynx is clear. No oropharyngeal exudate or posterior oropharyngeal erythema.      Tonsils: No tonsillar exudate or tonsillar abscesses.   Cardiovascular:      Rate and Rhythm: Tachycardia present.      Pulses: Normal pulses.      Heart sounds: Normal heart sounds.   Pulmonary:      Effort: Pulmonary effort is normal. No respiratory distress.      Breath sounds: Normal breath sounds. No stridor. No wheezing or rhonchi.   Lymphadenopathy:      Cervical: No cervical adenopathy.   Neurological:      Mental Status: She is alert.             REVIEWED DATA     Labs:     Lab Results   Component Value Date    CHLPL 225 (H) 07/16/2024    TRIG 260 (H) 07/16/2024    HDL 57 07/16/2024     (H) 07/16/2024    VLDL 45 (H) 07/16/2024    HGBA1C 5.10 07/16/2024           Imaging:           Medical Tests:             Summary of old records / correspondence / consultant report:           Request outside records:

## 2024-11-01 ENCOUNTER — NURSE TRIAGE (OUTPATIENT)
Dept: CALL CENTER | Facility: HOSPITAL | Age: 34
End: 2024-11-01
Payer: OTHER GOVERNMENT

## 2024-11-01 DIAGNOSIS — R06.81 APNEIC: ICD-10-CM

## 2024-11-01 DIAGNOSIS — R06.83 SNORING: Primary | ICD-10-CM

## 2024-11-01 NOTE — TELEPHONE ENCOUNTER
HUB    Stops breathing when sleeping     says she is snoring a lot during sleep and her  says she is stopping breathing, he nudges her, arouses her and she begins breathing    Reports has been going on a couple of weeks    Requesting to make an appointment.    Attempt to reach PCP office for warm transfer. No answer to multiple rings    Advised caller I will send a High Priority note to PCP office asking for a return call to patient to scheduled an appointment.    Caller understands, unable to reach office and will send note to PCP office requesting a call back.  Reason for Disposition   [1] MILD longstanding difficulty breathing AND [2]  SAME as normal    Additional Information   Negative: SEVERE difficulty breathing (e.g., struggling for each breath, speaks in single words)   Negative: [1] Breathing stopped AND [2] hasn't returned   Negative: Choking on something   Negative: Bluish (or gray) lips or face now   Negative: Difficult to awaken or acting confused (e.g., disoriented, slurred speech)   Negative: Passed out (i.e., lost consciousness, collapsed and was not responding)   Negative: Wheezing started suddenly after medicine, an allergic food or bee sting   Negative: Stridor (harsh sound while breathing in)   Negative: Slow, shallow and weak breathing   Negative: Sounds like a life-threatening emergency to the triager   Negative: Chest pain   Negative: [1] Wheezing (high pitched whistling sound) AND [2] previous asthma attacks or use of asthma medicines   Negative: [1] Difficulty breathing AND [2] only present when coughing   Negative: [1] Difficulty breathing AND [2] only from stuffy or runny nose   Negative: [1] Difficulty breathing AND [2] within 14 days of COVID-19 Exposure   Negative: [1] MODERATE difficulty breathing (e.g., speaks in phrases, SOB even at rest, pulse 100-120) AND [2] NEW-onset or WORSE than normal   Negative: Oxygen level (e.g., pulse oximetry) 90 percent or lower   Negative:  "Wheezing can be heard across the room   Negative: Drooling or spitting out saliva (because can't swallow)   Negative: History of prior \"blood clot\" in leg or lungs (i.e., deep vein thrombosis, pulmonary embolism)   Negative: History of inherited increased risk of blood clots (e.g., Factor 5 Leiden, Anti-thrombin 3, Protein C or Protein S deficiency, Prothrombin mutation)   Negative: Major surgery in the past month   Negative: Hip or leg fracture (broken bone) in past month (or had cast on leg or ankle in past month)   Negative: Illness requiring prolonged bedrest in past month (e.g., immobilization, long hospital stay)   Negative: Long-distance travel in past month (e.g., car, bus, train, plane; with trip lasting 6 or more hours)   Negative: Cancer treatment in past six months (or has cancer now)   Negative: Extra heartbeats, irregular heart beating, or heart is beating very fast  (i.e., \"palpitations\")   Negative: Fever > 103 F (39.4 C)   Negative: [1] Fever > 101 F (38.3 C) AND [2] age > 60 years   Negative: [1] Fever > 100.0 F (37.8 C) AND [2] bedridden (e.g., CVA, chronic illness, recovering from surgery)   Negative: [1] Fever > 100.0 F (37.8 C) AND [2] diabetes mellitus or weak immune system (e.g., HIV positive, cancer chemo, splenectomy, organ transplant, chronic steroids)   Negative: [1] Periods where breathing stops and then resumes normally AND [2] bedridden (e.g., CVA)   Negative: Pregnant or postpartum (from 0 to 6 weeks after delivery)   Negative: Patient sounds very sick or weak to the triager   Negative: [1] MILD difficulty breathing (e.g., minimal/no SOB at rest, SOB with walking, pulse <100) AND [2] NEW-onset or WORSE than normal   Negative: [1] Longstanding difficulty breathing (e.g., CHF, COPD, emphysema) AND [2] WORSE than normal   Negative: [1] Longstanding difficulty breathing AND [2] not responding to usual therapy   Negative: Continuous (nonstop) coughing interferes with work, school, or " "sleeping   Negative: Oxygen level (e.g., pulse oximetry) 91 to 94 percent   Negative: [1] MODERATE longstanding difficulty breathing (e.g., speaks in phrases, SOB even at rest, pulse 100-120) AND [2] SAME as normal    Answer Assessment - Initial Assessment Questions  1. RESPIRATORY STATUS: \"Describe your breathing?\" (e.g., wheezing, shortness of breath, unable to speak, severe coughing)       Snoring more at night  2. ONSET: \"When did this breathing problem begin?\"           3. PATTERN \"Does the difficult breathing come and go, or has it been constant since it started?\"    Occurs at night when she is asleep. She is unaware of it   has notice snoring  4. SEVERITY: \"How bad is your breathing?\" (e.g., mild, moderate, severe)     - MILD: No SOB at rest, mild SOB with walking, speaks normally in sentences, can lie down, no retractions, pulse < 100.     - MODERATE: SOB at rest, SOB with minimal exertion and prefers to sit, cannot lie down flat, speaks in phrases, mild retractions, audible wheezing, pulse 100-120.     - SEVERE: Very SOB at rest, speaks in single words, struggling to breathe, sitting hunched forward, retractions, pulse > 120      Patient reports no SOB  5. RECURRENT SYMPTOM: \"Have you had difficulty breathing before?\" If Yes, ask: \"When was the last time?\" and \"What happened that time?\"       *No Answer*  6. CARDIAC HISTORY: \"Do you have any history of heart disease?\" (e.g., heart attack, angina, bypass surgery, angioplasty)       *No Answer*  7. LUNG HISTORY: \"Do you have any history of lung disease?\"  (e.g., pulmonary embolus, asthma, emphysema)      *No Answer*  8. CAUSE: \"What do you think is causing the breathing problem?\"       *No Answer*  9. OTHER SYMPTOMS: \"Do you have any other symptoms? (e.g., dizziness, runny nose, cough, chest pain, fever)      *No Answer*  10. O2 SATURATION MONITOR:  \"Do you use an oxygen saturation monitor (pulse oximeter) at home?\" If Yes, ask: \"What is your " "reading (oxygen level) today?\" \"What is your usual oxygen saturation reading?\" (e.g., 95%)        *No Answer*  11. PREGNANCY: \"Is there any chance you are pregnant?\" \"When was your last menstrual period?\"        *No Answer*  12. TRAVEL: \"Have you traveled out of the country in the last month?\" (e.g., travel history, exposures)        *No Answer*    Protocols used: Breathing Difficulty-ADULT-AH    "

## 2024-11-17 ENCOUNTER — APPOINTMENT (OUTPATIENT)
Dept: MRI IMAGING | Facility: HOSPITAL | Age: 34
End: 2024-11-17
Payer: COMMERCIAL

## 2024-11-17 ENCOUNTER — APPOINTMENT (OUTPATIENT)
Dept: CT IMAGING | Facility: HOSPITAL | Age: 34
End: 2024-11-17
Payer: COMMERCIAL

## 2024-11-17 ENCOUNTER — HOSPITAL ENCOUNTER (OUTPATIENT)
Facility: HOSPITAL | Age: 34
Discharge: HOME | End: 2024-11-17
Attending: STUDENT IN AN ORGANIZED HEALTH CARE EDUCATION/TRAINING PROGRAM | Admitting: EMERGENCY MEDICINE
Payer: COMMERCIAL

## 2024-11-17 ENCOUNTER — READMISSION MANAGEMENT (OUTPATIENT)
Dept: CALL CENTER | Facility: HOSPITAL | Age: 34
End: 2024-11-17
Payer: OTHER GOVERNMENT

## 2024-11-17 VITALS
DIASTOLIC BLOOD PRESSURE: 84 MMHG | RESPIRATION RATE: 18 BRPM | WEIGHT: 209 LBS | HEIGHT: 66 IN | HEART RATE: 75 BPM | BODY MASS INDEX: 33.59 KG/M2 | SYSTOLIC BLOOD PRESSURE: 134 MMHG | TEMPERATURE: 97.7 F | OXYGEN SATURATION: 100 %

## 2024-11-17 DIAGNOSIS — R42 DIZZINESS: Primary | ICD-10-CM

## 2024-11-17 LAB
ALBUMIN SERPL-MCNC: 4.1 G/DL (ref 3.5–5.2)
ALBUMIN/GLOB SERPL: 1.2 G/DL
ALP SERPL-CCNC: 88 U/L (ref 39–117)
ALT SERPL W P-5'-P-CCNC: 10 U/L (ref 1–33)
ANION GAP SERPL CALCULATED.3IONS-SCNC: 18.2 MMOL/L (ref 5–15)
APTT PPP: 22 SECONDS (ref 22.7–35.4)
AST SERPL-CCNC: 10 U/L (ref 1–32)
BACTERIA UR QL AUTO: ABNORMAL /HPF
BASOPHILS # BLD AUTO: 0.06 10*3/MM3 (ref 0–0.2)
BASOPHILS NFR BLD AUTO: 0.5 % (ref 0–1.5)
BILIRUB SERPL-MCNC: 0.2 MG/DL (ref 0–1.2)
BILIRUB UR QL STRIP: NEGATIVE
BUN SERPL-MCNC: 12 MG/DL (ref 6–20)
BUN/CREAT SERPL: 14.6 (ref 7–25)
CALCIUM SPEC-SCNC: 8.9 MG/DL (ref 8.6–10.5)
CHLORIDE SERPL-SCNC: 102 MMOL/L (ref 98–107)
CLARITY UR: CLEAR
CO2 SERPL-SCNC: 19.8 MMOL/L (ref 22–29)
COLOR UR: YELLOW
CREAT SERPL-MCNC: 0.82 MG/DL (ref 0.57–1)
DEPRECATED RDW RBC AUTO: 40.2 FL (ref 37–54)
EGFRCR SERPLBLD CKD-EPI 2021: 96.4 ML/MIN/1.73
EOSINOPHIL # BLD AUTO: 0.09 10*3/MM3 (ref 0–0.4)
EOSINOPHIL NFR BLD AUTO: 0.8 % (ref 0.3–6.2)
ERYTHROCYTE [DISTWIDTH] IN BLOOD BY AUTOMATED COUNT: 12.9 % (ref 12.3–15.4)
GLOBULIN UR ELPH-MCNC: 3.4 GM/DL
GLUCOSE SERPL-MCNC: 126 MG/DL (ref 65–99)
GLUCOSE UR STRIP-MCNC: NEGATIVE MG/DL
HCT VFR BLD AUTO: 41.1 % (ref 34–46.6)
HGB BLD-MCNC: 13.4 G/DL (ref 12–15.9)
HGB UR QL STRIP.AUTO: NEGATIVE
HYALINE CASTS UR QL AUTO: ABNORMAL /LPF
IMM GRANULOCYTES # BLD AUTO: 0.04 10*3/MM3 (ref 0–0.05)
IMM GRANULOCYTES NFR BLD AUTO: 0.3 % (ref 0–0.5)
INR PPP: 0.95 (ref 0.9–1.1)
KETONES UR QL STRIP: NEGATIVE
LEUKOCYTE ESTERASE UR QL STRIP.AUTO: ABNORMAL
LYMPHOCYTES # BLD AUTO: 4.23 10*3/MM3 (ref 0.7–3.1)
LYMPHOCYTES NFR BLD AUTO: 37 % (ref 19.6–45.3)
MAGNESIUM SERPL-MCNC: 1.9 MG/DL (ref 1.6–2.6)
MCH RBC QN AUTO: 27.7 PG (ref 26.6–33)
MCHC RBC AUTO-ENTMCNC: 32.6 G/DL (ref 31.5–35.7)
MCV RBC AUTO: 84.9 FL (ref 79–97)
MONOCYTES # BLD AUTO: 0.94 10*3/MM3 (ref 0.1–0.9)
MONOCYTES NFR BLD AUTO: 8.2 % (ref 5–12)
NEUTROPHILS NFR BLD AUTO: 53.2 % (ref 42.7–76)
NEUTROPHILS NFR BLD AUTO: 6.08 10*3/MM3 (ref 1.7–7)
NITRITE UR QL STRIP: NEGATIVE
NRBC BLD AUTO-RTO: 0 /100 WBC (ref 0–0.2)
PH UR STRIP.AUTO: 8 [PH] (ref 5–8)
PLATELET # BLD AUTO: 359 10*3/MM3 (ref 140–450)
PMV BLD AUTO: 9.6 FL (ref 6–12)
POTASSIUM SERPL-SCNC: 3.1 MMOL/L (ref 3.5–5.2)
PROT SERPL-MCNC: 7.5 G/DL (ref 6–8.5)
PROT UR QL STRIP: NEGATIVE
PROTHROMBIN TIME: 12.8 SECONDS (ref 11.7–14.2)
QT INTERVAL: 394 MS
QTC INTERVAL: 438 MS
RBC # BLD AUTO: 4.84 10*6/MM3 (ref 3.77–5.28)
RBC # UR STRIP: ABNORMAL /HPF
REF LAB TEST METHOD: ABNORMAL
SODIUM SERPL-SCNC: 140 MMOL/L (ref 136–145)
SP GR UR STRIP: >1.03 (ref 1–1.03)
SQUAMOUS #/AREA URNS HPF: ABNORMAL /HPF
TROPONIN T SERPL HS-MCNC: <6 NG/L
UROBILINOGEN UR QL STRIP: ABNORMAL
WBC # UR STRIP: ABNORMAL /HPF
WBC NRBC COR # BLD AUTO: 11.44 10*3/MM3 (ref 3.4–10.8)

## 2024-11-17 PROCEDURE — 84484 ASSAY OF TROPONIN QUANT: CPT | Performed by: STUDENT IN AN ORGANIZED HEALTH CARE EDUCATION/TRAINING PROGRAM

## 2024-11-17 PROCEDURE — 70498 CT ANGIOGRAPHY NECK: CPT

## 2024-11-17 PROCEDURE — 81001 URINALYSIS AUTO W/SCOPE: CPT | Performed by: STUDENT IN AN ORGANIZED HEALTH CARE EDUCATION/TRAINING PROGRAM

## 2024-11-17 PROCEDURE — G0378 HOSPITAL OBSERVATION PER HR: HCPCS

## 2024-11-17 PROCEDURE — 85610 PROTHROMBIN TIME: CPT | Performed by: STUDENT IN AN ORGANIZED HEALTH CARE EDUCATION/TRAINING PROGRAM

## 2024-11-17 PROCEDURE — 96374 THER/PROPH/DIAG INJ IV PUSH: CPT

## 2024-11-17 PROCEDURE — 97112 NEUROMUSCULAR REEDUCATION: CPT

## 2024-11-17 PROCEDURE — 99285 EMERGENCY DEPT VISIT HI MDM: CPT

## 2024-11-17 PROCEDURE — 99203 OFFICE O/P NEW LOW 30 MIN: CPT | Performed by: PSYCHIATRY & NEUROLOGY

## 2024-11-17 PROCEDURE — 97161 PT EVAL LOW COMPLEX 20 MIN: CPT

## 2024-11-17 PROCEDURE — 93010 ELECTROCARDIOGRAM REPORT: CPT | Performed by: INTERNAL MEDICINE

## 2024-11-17 PROCEDURE — 93005 ELECTROCARDIOGRAM TRACING: CPT | Performed by: STUDENT IN AN ORGANIZED HEALTH CARE EDUCATION/TRAINING PROGRAM

## 2024-11-17 PROCEDURE — 80053 COMPREHEN METABOLIC PANEL: CPT | Performed by: STUDENT IN AN ORGANIZED HEALTH CARE EDUCATION/TRAINING PROGRAM

## 2024-11-17 PROCEDURE — 25010000002 ONDANSETRON PER 1 MG: Performed by: STUDENT IN AN ORGANIZED HEALTH CARE EDUCATION/TRAINING PROGRAM

## 2024-11-17 PROCEDURE — 70551 MRI BRAIN STEM W/O DYE: CPT

## 2024-11-17 PROCEDURE — 70496 CT ANGIOGRAPHY HEAD: CPT

## 2024-11-17 PROCEDURE — 85730 THROMBOPLASTIN TIME PARTIAL: CPT | Performed by: STUDENT IN AN ORGANIZED HEALTH CARE EDUCATION/TRAINING PROGRAM

## 2024-11-17 PROCEDURE — 83735 ASSAY OF MAGNESIUM: CPT | Performed by: STUDENT IN AN ORGANIZED HEALTH CARE EDUCATION/TRAINING PROGRAM

## 2024-11-17 PROCEDURE — 85025 COMPLETE CBC W/AUTO DIFF WBC: CPT | Performed by: STUDENT IN AN ORGANIZED HEALTH CARE EDUCATION/TRAINING PROGRAM

## 2024-11-17 PROCEDURE — 25510000001 IOPAMIDOL PER 1 ML: Performed by: STUDENT IN AN ORGANIZED HEALTH CARE EDUCATION/TRAINING PROGRAM

## 2024-11-17 RX ORDER — POTASSIUM CHLORIDE 750 MG/1
40 TABLET, FILM COATED, EXTENDED RELEASE ORAL EVERY 4 HOURS
Status: DISCONTINUED | OUTPATIENT
Start: 2024-11-17 | End: 2024-11-17 | Stop reason: HOSPADM

## 2024-11-17 RX ORDER — IOPAMIDOL 755 MG/ML
100 INJECTION, SOLUTION INTRAVASCULAR
Status: COMPLETED | OUTPATIENT
Start: 2024-11-17 | End: 2024-11-17

## 2024-11-17 RX ORDER — ACETAMINOPHEN 325 MG/1
650 TABLET ORAL EVERY 4 HOURS PRN
Status: DISCONTINUED | OUTPATIENT
Start: 2024-11-17 | End: 2024-11-17 | Stop reason: HOSPADM

## 2024-11-17 RX ORDER — ONDANSETRON 2 MG/ML
4 INJECTION INTRAMUSCULAR; INTRAVENOUS ONCE
Status: COMPLETED | OUTPATIENT
Start: 2024-11-17 | End: 2024-11-17

## 2024-11-17 RX ORDER — ONDANSETRON 4 MG/1
4 TABLET, ORALLY DISINTEGRATING ORAL EVERY 6 HOURS PRN
Status: DISCONTINUED | OUTPATIENT
Start: 2024-11-17 | End: 2024-11-17 | Stop reason: HOSPADM

## 2024-11-17 RX ORDER — BISACODYL 5 MG/1
5 TABLET, DELAYED RELEASE ORAL DAILY PRN
Status: DISCONTINUED | OUTPATIENT
Start: 2024-11-17 | End: 2024-11-17 | Stop reason: HOSPADM

## 2024-11-17 RX ORDER — AMOXICILLIN 250 MG
2 CAPSULE ORAL 2 TIMES DAILY PRN
Status: DISCONTINUED | OUTPATIENT
Start: 2024-11-17 | End: 2024-11-17 | Stop reason: HOSPADM

## 2024-11-17 RX ORDER — SODIUM CHLORIDE 0.9 % (FLUSH) 0.9 %
10 SYRINGE (ML) INJECTION AS NEEDED
Status: DISCONTINUED | OUTPATIENT
Start: 2024-11-17 | End: 2024-11-17 | Stop reason: HOSPADM

## 2024-11-17 RX ORDER — POLYETHYLENE GLYCOL 3350 17 G/17G
17 POWDER, FOR SOLUTION ORAL DAILY PRN
Status: DISCONTINUED | OUTPATIENT
Start: 2024-11-17 | End: 2024-11-17 | Stop reason: HOSPADM

## 2024-11-17 RX ORDER — NITROGLYCERIN 0.4 MG/1
0.4 TABLET SUBLINGUAL
Status: DISCONTINUED | OUTPATIENT
Start: 2024-11-17 | End: 2024-11-17 | Stop reason: HOSPADM

## 2024-11-17 RX ORDER — ONDANSETRON 2 MG/ML
4 INJECTION INTRAMUSCULAR; INTRAVENOUS EVERY 6 HOURS PRN
Status: DISCONTINUED | OUTPATIENT
Start: 2024-11-17 | End: 2024-11-17 | Stop reason: HOSPADM

## 2024-11-17 RX ORDER — BISACODYL 10 MG
10 SUPPOSITORY, RECTAL RECTAL DAILY PRN
Status: DISCONTINUED | OUTPATIENT
Start: 2024-11-17 | End: 2024-11-17 | Stop reason: HOSPADM

## 2024-11-17 RX ORDER — ACETAMINOPHEN 160 MG/5ML
650 SOLUTION ORAL EVERY 4 HOURS PRN
Status: DISCONTINUED | OUTPATIENT
Start: 2024-11-17 | End: 2024-11-17 | Stop reason: HOSPADM

## 2024-11-17 RX ORDER — SODIUM CHLORIDE 0.9 % (FLUSH) 0.9 %
10 SYRINGE (ML) INJECTION EVERY 12 HOURS SCHEDULED
Status: DISCONTINUED | OUTPATIENT
Start: 2024-11-17 | End: 2024-11-17 | Stop reason: HOSPADM

## 2024-11-17 RX ORDER — ACETAMINOPHEN 650 MG/1
650 SUPPOSITORY RECTAL EVERY 4 HOURS PRN
Status: DISCONTINUED | OUTPATIENT
Start: 2024-11-17 | End: 2024-11-17 | Stop reason: HOSPADM

## 2024-11-17 RX ORDER — SODIUM CHLORIDE 9 MG/ML
40 INJECTION, SOLUTION INTRAVENOUS AS NEEDED
Status: DISCONTINUED | OUTPATIENT
Start: 2024-11-17 | End: 2024-11-17 | Stop reason: HOSPADM

## 2024-11-17 RX ORDER — MECLIZINE HYDROCHLORIDE 25 MG/1
25 TABLET ORAL 3 TIMES DAILY PRN
Status: DISCONTINUED | OUTPATIENT
Start: 2024-11-17 | End: 2024-11-17 | Stop reason: HOSPADM

## 2024-11-17 RX ADMIN — POTASSIUM CHLORIDE 40 MEQ: 750 TABLET, EXTENDED RELEASE ORAL at 06:04

## 2024-11-17 RX ADMIN — IOPAMIDOL 95 ML: 755 INJECTION, SOLUTION INTRAVENOUS at 02:04

## 2024-11-17 RX ADMIN — Medication 10 ML: at 08:16

## 2024-11-17 RX ADMIN — SERTRALINE 50 MG: 50 TABLET, FILM COATED ORAL at 08:15

## 2024-11-17 RX ADMIN — ONDANSETRON 4 MG: 2 INJECTION, SOLUTION INTRAMUSCULAR; INTRAVENOUS at 01:45

## 2024-11-17 RX ADMIN — POTASSIUM CHLORIDE 40 MEQ: 750 TABLET, EXTENDED RELEASE ORAL at 09:17

## 2024-11-17 NOTE — H&P
[facility]   HISTORY AND PHYSICAL    Patient Name: Leonie Gomez  : 1990  MRN: [id number]  Primary Care Physician:  José Luis Dixon DNP, APRN  Date of admission: 2024    Subjective   Subjective     Chief Complaint:   Chief Complaint   Patient presents with    Dizziness         HPI:    Leonie Gomez is a 34 y.o. female, with a past medical history including but not limited to, anxiety and a one-time seizure, presented to the emergency department with a sudden onset of headache, room spinning dizziness, nausea, and vomiting.  Patient states that she was laying in bed scrolling on her phone when she had a sudden onset of a headache.  She states she then became dizzy and started vomiting.  She denies having a history of migraine headaches or having dizziness in the past.  She states she was unable to walk due to the dizziness.  All symptoms have resolved at this time.  Neurology has been consulted to see the patient in the AM.    Review of Systems   All systems were reviewed and negative except for: What was mentioned above in the HPI.    Personal History     Past Medical History:   Diagnosis Date    Anxiety     COVID     Dysmenorrhea     Seizure 2019       Past Surgical History:   Procedure Laterality Date    BREAST AUGMENTATION  2020    EAR TUBES      REFRACTIVE SURGERY         Family History: family history includes Brain cancer (age of onset: 60) in her maternal aunt; Cancer in her maternal grandfather; Diabetes in her father; Heart attack in her father; Heart disease in her father and maternal grandfather; Hypertension in her father and maternal grandfather; No Known Problems in her mother; Pancreatic cancer in her maternal grandfather. Otherwise pertinent FHx was reviewed and not pertinent to current issue.    Social History:  reports that she has never smoked. She has never used smokeless tobacco. She reports current alcohol use. She reports that she does not use drugs.    Home  Medications:  albuterol sulfate HFA, azithromycin, norgestimate-ethinyl estradiol, and sertraline    Allergies:  Allergies   Allergen Reactions    Amoxicillin Rash    Penicillins Rash       Objective   Objective     Vitals:   Temp:  [98.7 °F (37.1 °C)] 98.7 °F (37.1 °C)  Heart Rate:  [] 78  Resp:  [18-22] 18  BP: (134-143)/(90-99) 134/97  Physical Exam   Constitutional: Awake, alert   Eyes: PERRLA   HENT: NCAT, mucous membranes moist   Neck: Supple   Respiratory: Clear to auscultation bilaterally, nonlabored respirations    Cardiovascular: regular rate, palpable pedal pulses bilaterally   Gastrointestinal: Positive bowel sounds, soft, nontender, nondistended   Musculoskeletal: No bilateral ankle edema   Psychiatric: Appropriate affect, cooperative   Neurologic: Oriented x 3, speech clear   Skin: No rashes       Result Review    Result Review:  I have personally reviewed the results from the time of this admission to 11/17/2024 04:32 EST and agree with these findings:  [x]  Laboratory list / accordion  []  Microbiology  [x]  Radiology  []  EKG/Telemetry   []  Cardiology/Vascular   []  Pathology  [x]  Old records  []  Other:    Lab work done emergency department is unremarkable other than a potassium of 3.1, glucose 126, WBCs 11.44, all other lab work is at baseline for the patient.  Urinalysis shows no signs of infection.  CT head without contrast shows no acute intracranial abnormality identified.  CTA head and neck shows no significant stenosis, occlusion, or aneurysm of the central or large intracranial arteries.      Assessment & Plan   Assessment / Plan     Brief Patient Summary:  Leonie Gomez is a 34 y.o. female who was admitted to the observation unit for further evaluation and treatment of her dizziness.    Active Hospital Problems:  Active Hospital Problems    Diagnosis     **Dizziness      Plan:     Dizziness   -Neurochecks every 4 hours   -Vital signs every 4 hours   -Cardiac monitoring    -MRI-no acute infarct, hemorrhage, or hydrocephalus.  -CT Head without contrast-no acute intracranial abnormality identified.  -CTA Head/neck no significant stenosis, occlusion, or aneurysm of the central or large intracranial arteries.  -PT to eval and treat  -Neuro consult   -PT to eval and treat-vestibular therapy      VTE Prophylaxis:  Mechanical VTE prophylaxis orders are present.        CODE STATUS:    Code Status (Patient has no pulse and is not breathing): CPR (Attempt to Resuscitate)  Medical Interventions (Patient has pulse or is breathing): Full Support    Admission Status:  I believe this patient meets observation status.    76 minutes have been spent by [facility] providers in the care of this patient while under observation status.      Appropriate PPE worn during patient encounter.  Hand hygeine performed before and after seeing the patient.      Electronically signed by ALONA Naranjo, 11/17/24, 4:32 AM EST.

## 2024-11-17 NOTE — ED PROVIDER NOTES
EMERGENCY DEPARTMENT ENCOUNTER  Room Number:  12/12  PCP: José Luis Dixon, SRINI, APRN  Independent Historians: Patient and Family      HPI:  Chief Complaint: had concerns including Dizziness.     Context: Leonie Gomez is a 34 y.o. female with a medical history of anxiety, seizure disorder who presents to the ED c/o acute dizziness.  Patient states she was lying in bed earlier this evening when she had sudden onset of dizziness.  Patient attempted to go to sleep but dizziness was too intense.  Patient does have associated nausea.  Patient states she has had difficulty with ambulation and is requiring assistance to get around.  Patient denies any unilateral weakness.  Patient denies history of CVA, blood clots.      Review of prior external notes (non-ED) -and- Review of prior external test results outside of this encounter: Office visit with internal medicine from 9/6/2024 reviewed and notable for visit secondary to fever and bronchitis.  Patient noted to have negative COVID and flu testing, diagnosed with bronchitis and started on Z-Migel and albuterol inhaler.    Prescription drug monitoring program review:         PAST MEDICAL HISTORY  Active Ambulatory Problems     Diagnosis Date Noted    Vitamin D deficiency 03/15/2019    Seizure 09/19/2019    Anxiety     Dehydration 05/10/2022    Acute diarrhea 05/10/2022    Neck pain 05/06/2019     Resolved Ambulatory Problems     Diagnosis Date Noted    No Resolved Ambulatory Problems     Past Medical History:   Diagnosis Date    COVID     Dysmenorrhea          PAST SURGICAL HISTORY  Past Surgical History:   Procedure Laterality Date    BREAST AUGMENTATION  08/2020    EAR TUBES      REFRACTIVE SURGERY           FAMILY HISTORY  Family History   Problem Relation Age of Onset    No Known Problems Mother     Heart disease Father     Heart attack Father     Diabetes Father     Hypertension Father     Heart disease Maternal Grandfather     Cancer Maternal Grandfather     Pancreatic  cancer Maternal Grandfather     Hypertension Maternal Grandfather     Brain cancer Maternal Aunt 60         SOCIAL HISTORY  Social History     Socioeconomic History    Marital status:     Number of children: 1   Tobacco Use    Smoking status: Never    Smokeless tobacco: Never   Vaping Use    Vaping status: Never Used   Substance and Sexual Activity    Alcohol use: Yes     Comment: occasionally    Drug use: Never    Sexual activity: Yes     Partners: Male     Birth control/protection: OCP         ALLERGIES  Amoxicillin and Penicillins      REVIEW OF SYSTEMS  Review of Systems  Included in HPI  All systems reviewed and negative except for those discussed in HPI.      PHYSICAL EXAM    I have reviewed the triage vital signs and nursing notes.    ED Triage Vitals   Temp Heart Rate Resp BP SpO2   11/17/24 0120 11/17/24 0120 11/17/24 0120 11/17/24 0134 11/17/24 0120   98.7 °F (37.1 °C) 110 22 138/99 99 %      Temp src Heart Rate Source Patient Position BP Location FiO2 (%)   11/17/24 0120 -- 11/17/24 0134 11/17/24 0134 --   Tympanic  Sitting Right arm        Physical Exam  GENERAL: alert, no acute distress  SKIN: Warm, dry  HENT: Normocephalic, atraumatic  EYES: no scleral icterus, mild horizontal end gaze nystagmus  CV: regular rhythm, regular rate  RESPIRATORY: normal effort, lungs clear  ABDOMEN: soft, nontender, nondistended  MUSCULOSKELETAL: no deformity  NEURO: alert, moves all extremities, follows commands.  No focal motor/sensory deficits.  No facial droop, no ataxia            LAB RESULTS  Recent Results (from the past 24 hours)   Comprehensive Metabolic Panel    Collection Time: 11/17/24  1:40 AM    Specimen: Blood   Result Value Ref Range    Glucose 126 (H) 65 - 99 mg/dL    BUN 12 6 - 20 mg/dL    Creatinine 0.82 0.57 - 1.00 mg/dL    Sodium 140 136 - 145 mmol/L    Potassium 3.1 (L) 3.5 - 5.2 mmol/L    Chloride 102 98 - 107 mmol/L    CO2 19.8 (L) 22.0 - 29.0 mmol/L    Calcium 8.9 8.6 - 10.5 mg/dL    Total  Protein 7.5 6.0 - 8.5 g/dL    Albumin 4.1 3.5 - 5.2 g/dL    ALT (SGPT) 10 1 - 33 U/L    AST (SGOT) 10 1 - 32 U/L    Alkaline Phosphatase 88 39 - 117 U/L    Total Bilirubin 0.2 0.0 - 1.2 mg/dL    Globulin 3.4 gm/dL    A/G Ratio 1.2 g/dL    BUN/Creatinine Ratio 14.6 7.0 - 25.0    Anion Gap 18.2 (H) 5.0 - 15.0 mmol/L    eGFR 96.4 >60.0 mL/min/1.73   aPTT    Collection Time: 11/17/24  1:40 AM    Specimen: Blood   Result Value Ref Range    PTT 22.0 (L) 22.7 - 35.4 seconds   Protime-INR    Collection Time: 11/17/24  1:40 AM    Specimen: Blood   Result Value Ref Range    Protime 12.8 11.7 - 14.2 Seconds    INR 0.95 0.90 - 1.10   Single High Sensitivity Troponin T    Collection Time: 11/17/24  1:40 AM    Specimen: Blood   Result Value Ref Range    HS Troponin T <6 <14 ng/L   CBC Auto Differential    Collection Time: 11/17/24  1:40 AM    Specimen: Blood   Result Value Ref Range    WBC 11.44 (H) 3.40 - 10.80 10*3/mm3    RBC 4.84 3.77 - 5.28 10*6/mm3    Hemoglobin 13.4 12.0 - 15.9 g/dL    Hematocrit 41.1 34.0 - 46.6 %    MCV 84.9 79.0 - 97.0 fL    MCH 27.7 26.6 - 33.0 pg    MCHC 32.6 31.5 - 35.7 g/dL    RDW 12.9 12.3 - 15.4 %    RDW-SD 40.2 37.0 - 54.0 fl    MPV 9.6 6.0 - 12.0 fL    Platelets 359 140 - 450 10*3/mm3    Neutrophil % 53.2 42.7 - 76.0 %    Lymphocyte % 37.0 19.6 - 45.3 %    Monocyte % 8.2 5.0 - 12.0 %    Eosinophil % 0.8 0.3 - 6.2 %    Basophil % 0.5 0.0 - 1.5 %    Immature Grans % 0.3 0.0 - 0.5 %    Neutrophils, Absolute 6.08 1.70 - 7.00 10*3/mm3    Lymphocytes, Absolute 4.23 (H) 0.70 - 3.10 10*3/mm3    Monocytes, Absolute 0.94 (H) 0.10 - 0.90 10*3/mm3    Eosinophils, Absolute 0.09 0.00 - 0.40 10*3/mm3    Basophils, Absolute 0.06 0.00 - 0.20 10*3/mm3    Immature Grans, Absolute 0.04 0.00 - 0.05 10*3/mm3    nRBC 0.0 0.0 - 0.2 /100 WBC   Magnesium    Collection Time: 11/17/24  1:40 AM    Specimen: Blood   Result Value Ref Range    Magnesium 1.9 1.6 - 2.6 mg/dL   ECG 12 Lead Tachycardia    Collection Time: 11/17/24   2:22 AM   Result Value Ref Range    QT Interval 394 ms    QTC Interval 438 ms         RADIOLOGY  CT Angiogram Head    Result Date: 11/17/2024  Patient: DENA KIRKLAND  Time Out: 02:42 Exam(s): CTA HEAD EXAM:   CT Angiography Head Without and With Intravenous Contrast CLINICAL HISTORY:    Reason for exam: dizziness. TECHNIQUE: AI analysis of LVO was utilized   Axial computed tomographic angiography images of the head without and with intravenous contrast.  CTDI is 42.59 mGy and DLP is 1545.5 mGy-cm.  This CT exam was performed according to the principle of ALARA (As Low As Reasonably Achievable) by using one or more of the following dose reduction techniques: automated exposure control, adjustment of the mA and or kV according to patient size, and or use of iterative reconstruction technique.   MIP reconstructed images were created and reviewed. COMPARISON:   None FINDINGS:  VASCULATURE:   Right internal carotid artery:  No acute findings.  Intracranial segment is patent with no significant stenosis.  No aneurysm.   Right anterior cerebral artery:  Unremarkable.  No occlusion or significant stenosis.  No aneurysm.   Right middle cerebral artery:  Unremarkable.  No occlusion or significant stenosis.  No aneurysm.   Right posterior cerebral artery:  Unremarkable.  No occlusion or significant stenosis.  No aneurysm.   Right vertebral artery:  The right vertebral artery appears to terminate as the PICA, normal variant.   Left internal carotid artery:  No acute findings.  Intracranial segment is patent with no significant stenosis.  No aneurysm.   Left anterior cerebral artery:  Unremarkable.  No occlusion or significant stenosis.  No aneurysm.   Left middle cerebral artery:  Unremarkable.  No occlusion or significant stenosis.  No aneurysm.   Left posterior cerebral artery:  Unremarkable.  No occlusion or significant stenosis.  No aneurysm.   Left vertebral artery:  Unremarkable as visualized.   Basilar artery:   Unremarkable.  No occlusion or significant stenosis.  No aneurysm.  HEAD:   Brain:  No acute findings.  No hemorrhage.  No edema.  Normal enhancement.   Ventricles:  Unremarkable.  No ventriculomegaly.   Bones joints:  No acute fracture.   Soft tissues:  Unremarkable.   Sinuses:  Unremarkable as visualized.  No acute sinusitis.   Mastoid air cells:  Unremarkable as visualized.  No mastoid effusion. IMPRESSION:     1.  No acute intracranial abnormality identified. 2.  No significant stenosis, occlusion, or aneurysm of the central or large intracranial arteries.    Electronically signed by Emir Junior M.D. on 11-17-24 at 0242    CT Angiogram Neck    Result Date: 11/17/2024  Patient: DENA KIRKLAND  Time Out: 02:39 Exam(s): CTA NECK EXAM:   CT Angiography Neck With Intravenous Contrast CLINICAL HISTORY:    Reason for exam: dizziness. TECHNIQUE:   Routine carotid CT angiography protocol was performed with intravenous contrast.  NASCET criteria using the distal ICAs for comparison were used for evaluation of stenoses.  CTDI is 42.59 mGy and DLP is 1545.5 mGy-cm.  This CT exam was performed according to the principle of ALARA (As Low As Reasonably Achievable) by using one or more of the following dose reduction techniques: automated exposure control, adjustment of the mA and or kV according to patient size, and or use of iterative reconstruction technique. COMPARISON:   None FINDINGS:  VASCULATURE:   Right common carotid artery:  Unremarkable.  No occlusion or significant stenosis.  No dissection.   Right internal carotid artery:  Unremarkable.  Extracranial segment is patent with no occlusion or significant stenosis.  No dissection.   Right external carotid artery:  Unremarkable.  No occlusion.   Right vertebral artery:  Unremarkable.  No occlusion or significant stenosis.  No dissection.   Left common carotid artery:  Unremarkable.  No occlusion or significant stenosis.  No dissection.   Left internal carotid  artery:  Unremarkable.  Extracranial segment is patent with no occlusion or significant stenosis.  No dissection.   Left external carotid artery:  Unremarkable.  No occlusion.   Left vertebral artery:  Dominant left vertebral artery.  No occlusion or significant stenosis.  No dissection.  NECK:   Bones joints:  Unremarkable.  No acute fracture.   Soft tissues:  Unremarkable.   Lung apices:  Clear.  CAROTID STENOSIS REFERENCE USING NASCET CRITERIA:   % ICA stenosis = (1 - narrowest ICA diameter diameter of distal cervical ICA) x 100.   Mild - <50% stenosis.   Moderate - 50-69% stenosis.   Severe - 70-94% stenosis.   Near occlusion - 95-99% stenosis.   Occluded - 100% stenosis. IMPRESSION:       No significant stenosis, occlusion, or dissection.     Electronically signed by Emir Junior M.D. on 11-17-24 at 0239       MEDICATIONS GIVEN IN ER  Medications   ondansetron (ZOFRAN) injection 4 mg (4 mg Intravenous Given 11/17/24 0145)   iopamidol (ISOVUE-370) 76 % injection 100 mL (95 mL Intravenous Given 11/17/24 0204)         ORDERS PLACED DURING THIS VISIT:  Orders Placed This Encounter   Procedures    CT Angiogram Neck    CT Angiogram Head    Comprehensive Metabolic Panel    aPTT    Protime-INR    Single High Sensitivity Troponin T    Urinalysis With Microscopic If Indicated (No Culture) - Urine, Clean Catch    Magnesium    CBC Auto Differential    ECG 12 Lead Tachycardia    Initiate ED Observation Status    CBC & Differential         OUTPATIENT MEDICATION MANAGEMENT:  No current Epic-ordered facility-administered medications on file.     Current Outpatient Medications Ordered in Epic   Medication Sig Dispense Refill    albuterol sulfate  (90 Base) MCG/ACT inhaler Inhale 2 puffs Every 4 (Four) Hours As Needed for Wheezing (cough). 8.5 g 0    azithromycin (Zithromax Z-Migel) 250 MG tablet Take 2 tablets by mouth on day 1, then 1 tablet daily on days 2-5 6 tablet 0    norgestimate-ethinyl estradiol (Sprintec 28)  0.25-35 MG-MCG per tablet Take 1 tablet by mouth Daily. 84 tablet 4    sertraline (ZOLOFT) 50 MG tablet Take 1 tablet by mouth Daily. 90 tablet 3         PROCEDURES  Procedures            PROGRESS, DATA ANALYSIS, CONSULTS, AND MEDICAL DECISION MAKING  All labs have been independently interpreted by me.  All radiology studies have been reviewed by me. All EKG's have been independently viewed and interpreted by me.  Discussion below represents my analysis of pertinent findings related to patient's condition, differential diagnosis, treatment plan and final disposition.    Differential diagnosis includes but is not limited to CVA, vestibular neuritis, BPPV, electrolyte abnormality, arrhythmia.    Clinical Scores:                                     ED Course as of 11/17/24 0249   Sun Nov 17, 2024   0135 Discussed with Dr. Aguillon of stroke neurology, recommends against proceeding with Team D evaluation at this time as it would be very unusual for this to be CVA, does recommend admission and brain MRI.  Will proceed with additional metabolic evaluation.   [MW]   0225 EKG interpreted by me demonstrates sinus rhythm, rate of 74, no OR/QT prolongation, ST elevation [MW]   0225 CT head interpreted by me and demonstrates no evidence of intracranial hemorrhage, additionally I see no evidence of posterior circulation occlusion, right vertebral artery appears diminutive [MW]   0247 Rechecked on patient and symptoms have improved considerably, imaging to this point is unremarkable.  Will plan on admission for MRI brain and further evaluation.  Patient is agreeable with this plan.  Patient is able to ambulate with light assistance at this time. [MW]   0249 Discussed with Gretchen CALVIN with [facility] who agrees to admit [MW]      ED Course User Index  [MW] Cecilio Mcclelland MD             AS OF 02:49 EST VITALS:    BP - 143/90  HR - 80  TEMP - 98.7 °F (37.1 °C) (Tympanic)  O2 SATS - 97%    COMPLEXITY OF CARE  The patient requires  admission.      DIAGNOSIS  Final diagnoses:   Dizziness         DISPOSITION  ED Disposition       ED Disposition   Decision to Admit    Condition   --    Comment   --                Please note that portions of this document were completed with a voice recognition program.    Note Disclaimer: At [facility], we believe that sharing information builds trust and better relationships. You are receiving this note because you recently visited [facility]. It is possible you will see health information before a provider has talked with you about it. This kind of information can be easy to misunderstand. To help you fully understand what it means for your health, we urge you to discuss this note with your provider.         Cecilio Mcclelland MD  11/17/24 0248

## 2024-11-17 NOTE — CONSULTS
"Neurology Consult Note    Consult Date: 11/17/2024    Referring MD: Cecilio Mcclelland MD    Reason for Consult I have been asked to see the patient in neurological consultation to render advice and opinion regarding vertigo    Leonie Gomez is a 34 y.o. female with previous history of seizures now in remission off Keppra who presents to the hospital with sudden onset of continuous room spinning vertigo.  She reports that this was unprovoked.  She was sitting in bed looking at her phone when she developed constant room spinning dizziness.  She was nauseous and had difficulty walking.  She presented to the emergency department where the physician noted nystagmus.  She underwent CTA and MRI which were normal.  Today her symptoms have mostly resolved.   she reports that her son recent had a similar illness that was felt to be viral and she thinks she may have been exposed to the same bug.    She was seen by PT earlier today who did a [place]oni-Hallpike which was objectively negative but did an Epley empirically.  Patient is not sure that it helped her symptoms but she did have some positional dizziness during the test.    Past Medical History:   Diagnosis Date    Anxiety     COVID     Dysmenorrhea     Seizure 04/19/2019       Exam  /84 (BP Location: Right arm, Patient Position: Lying)   Pulse 75   Temp 97.7 °F (36.5 °C) (Oral)   Resp 18   Ht 167.6 cm (65.98\")   Wt 94.8 kg (209 lb)   LMP 11/03/2024 (Approximate)   SpO2 100%   BMI 33.75 kg/m²   Gen: NAD, vitals reviewed  MS: oriented x3, recent/remote memory intact, normal attention/concentration, language intact, no neglect.  CN: visual acuity grossly normal, PERRL, EOMI with no nystagmus, negative head impulse test bilaterally, negative test of skew, no facial droop, no dysarthria  Motor: 5/5 throughout upper and lower extremities, normal tone  Coordination: No dysmetria  Sensory: Intact to cold temperature throughout    DATA:    Lab Results   Component " Value Date    GLUCOSE 126 (H) 11/17/2024    CALCIUM 8.9 11/17/2024     11/17/2024    K 3.1 (L) 11/17/2024    CO2 19.8 (L) 11/17/2024     11/17/2024    BUN 12 11/17/2024    CREATININE 0.82 11/17/2024    EGFRIFAFRI 100 02/05/2021    EGFRIFNONA 82 02/05/2021    BCR 14.6 11/17/2024    ANIONGAP 18.2 (H) 11/17/2024     Lab Results   Component Value Date    WBC 11.44 (H) 11/17/2024    HGB 13.4 11/17/2024    HCT 41.1 11/17/2024    MCV 84.9 11/17/2024     11/17/2024       Lab review: CBC, BMP reviewed    Imaging review: I personally reviewed her MRI brain and CTA head and neck performed overnight which on my review looked normal.  Radiology report reviewed.    Diagnoses:  Vestibular neuritis, improved  History of seizure currently in remission    Comment: Symptoms are most consistent with acute vestibular syndrome.  Differential includes vestibular neuritis and migraine.  I think the former is more likely.    PLAN:  As needed neurology follow-up  No further workup needed.

## 2024-11-17 NOTE — PLAN OF CARE
Goal Outcome Evaluation:           Progress: improving  Outcome Evaluation: Patient is a 34-year-old female in observation after reports of dizziness last night.  Patient's PLOF is independent with all functional mobility currently living with  and son in a split-level home.  PT performed a thorough assessment of pt’s musculoskeletal, nervous, cardiovascular, integumentary, respiratory, and digestive system and levels of functional independence. Examined current levels of functional components including but not limited to bed mobility, functional transfers, gait, seated balance, standing balance, posture, strength, fall risk.  Performed quick screen smooth pursuits with patient noted to have down beating nystagmus to the right, therefore performed [place]-Hallpike testing right posterior canal.  Did not observe any nystagmus nor did patient report any symptoms.  Patient's blood pressure obtained during sitting and standing with readings of 146/96 and 137/110 respectively.  PT educated patient on monitoring blood pressure throughout the week and possibly following up with primary care about possible hypertension.  Additionally PT discussed with patient to follow-up with physical therapy outpatient services for VOR training.    Anticipated Discharge Disposition (PT): home with outpatient therapy services

## 2024-11-17 NOTE — ED TRIAGE NOTES
Patient to ED from  c/o sudden onset dizziness, nausea, and vomiting that happened 30 minutes prior to arrival. Patient states the dizziness will not go away even when her eyes are closed. Patient's gait is disturbed. No one sided weakness. No facial droop observed.

## 2024-11-17 NOTE — DISCHARGE SUMMARY
ED OBSERVATION PROGRESS/DISCHARGE SUMMARY    Date of Admission: 11/17/2024   LOS: 0 days   PCP: José Luis Dixon, SRINI, APRN    Final Diagnosis vestibular neuritis      Subjective     Hospital Outcome:     Patient is a pleasant afebrile ambulatory 34-year-old  female admitted to the ED observation unit for dizziness.  She reports that her son and had a viral URI over the last 2 weeks and is unsure if she may have caught what he had to precipitate her symptoms.    She had a serum potassium of 3.1 which is being treated with electrolyte replacement protocol repletion.  The CTA of her head and neck did not reveal any acute intracranial findings.  Read appropriately was negative for acute intracranial findings.    This morning patient reports she has no headache.  Her dizziness is mildly present but markedly improved.  She describes it as room spinning like yesterday when she was reading on her Fay states the words were moving off the page to the left side.    Seen and evaluated by neurology team who advises patient's symptoms are consistent with vestibular neuritis and recommends outpatient follow up prn.    ROS:  General: no fevers, chills  Respiratory: no cough, dyspnea  Cardiovascular: no chest pain, palpitations  Abdomen: No abdominal pain, nausea, vomiting, or diarrhea  Neurologic: No focal weakness    Objective   Physical Exam:  I have reviewed the vital signs.  Temp:  [97.7 °F (36.5 °C)-98.7 °F (37.1 °C)] 97.7 °F (36.5 °C)  Heart Rate:  [] 76  Resp:  [18-22] 18  BP: (134-143)/(90-99) 134/91  General Appearance:    Alert, cooperative, no distress  Head:    Normocephalic, atraumatic  Eyes:    Sclerae anicteric  Neck:   Supple, no mass  Lungs: Clear to auscultation bilaterally, respirations unlabored  Heart: Regular rate and rhythm, S1 and S2 normal, no murmur, rub or gallop  Abdomen:  Soft, nontender, bowel sounds active, nondistended  Extremities: No clubbing, cyanosis, or edema to lower  extremities  Pulses:  2+ and symmetric in distal lower extremities  Skin: No rashes   Neurologic: Oriented x3, Normal strength to extremities    Results Review:    I have reviewed the labs, radiology results and diagnostic studies.    Results from last 7 days   Lab Units 11/17/24  0140   WBC 10*3/mm3 11.44*   HEMOGLOBIN g/dL 13.4   HEMATOCRIT % 41.1   PLATELETS 10*3/mm3 359     Results from last 7 days   Lab Units 11/17/24  0140   SODIUM mmol/L 140   POTASSIUM mmol/L 3.1*   CHLORIDE mmol/L 102   CO2 mmol/L 19.8*   BUN mg/dL 12   CREATININE mg/dL 0.82   CALCIUM mg/dL 8.9   BILIRUBIN mg/dL 0.2   ALK PHOS U/L 88   ALT (SGPT) U/L 10   AST (SGOT) U/L 10   GLUCOSE mg/dL 126*     Imaging Results (Last 24 Hours)       Procedure Component Value Units Date/Time    MRI Brain Without Contrast [[id number]] Collected: 11/17/24 0425     Updated: 11/17/24 0425    Narrative:        Patient: KHLOE KIRKLANDZABETH  Time Out: 04:25  Exam(s): MRI HEAD Without Contrast     EXAM:    MR Head Without Intravenous Contrast    CLINICAL HISTORY:     Reason for exam: dizziness.    TECHNIQUE:    Magnetic resonance images of the head brain without intravenous   contrast in multiple planes.    COMPARISON:    No relevant prior studies available.    FINDINGS:    Brain:  No mass.  No acute hemorrhage.  No restricted diffusion.    Ventricles:  No hydrocephalus.    Bones joints:  Unremarkable.    Sinuses:  No acute sinusitis.    Mastoid air cells:  No effusion.    Orbits:  Unremarkable.    IMPRESSION:         No acute infarct, hemorrhage, or hydrocephalus.      Impression:          Electronically signed by Gomez Akers MD on 11-17-24 at 0425    CT Angiogram Head [[id number]] Collected: 11/17/24 0242     Updated: 11/17/24 0242    Narrative:        Patient: ISAELDENA  Time Out: 02:42  Exam(s): CTA HEAD     EXAM:    CT Angiography Head Without and With Intravenous Contrast    CLINICAL HISTORY:     Reason for exam: dizziness.    TECHNIQUE:  AI  analysis of LVO was utilized    Axial computed tomographic angiography images of the head without and   with intravenous contrast.  CTDI is 42.59 mGy and DLP is 1545.5 mGy-cm.    This CT exam was performed according to the principle of ALARA (As Low As   Reasonably Achievable) by using one or more of the following dose   reduction techniques: automated exposure control, adjustment of the mA   and or kV according to patient size, and or use of iterative   reconstruction technique.    MIP reconstructed images were created and reviewed.    COMPARISON:    None    FINDINGS:     VASCULATURE:    Right internal carotid artery:  No acute findings.  Intracranial   segment is patent with no significant stenosis.  No aneurysm.    Right anterior cerebral artery:  Unremarkable.  No occlusion or   significant stenosis.  No aneurysm.    Right middle cerebral artery:  Unremarkable.  No occlusion or   significant stenosis.  No aneurysm.    Right posterior cerebral artery:  Unremarkable.  No occlusion or   significant stenosis.  No aneurysm.    Right vertebral artery:  The right vertebral artery appears to   terminate as the PICA, normal variant.      Left internal carotid artery:  No acute findings.  Intracranial segment   is patent with no significant stenosis.  No aneurysm.    Left anterior cerebral artery:  Unremarkable.  No occlusion or   significant stenosis.  No aneurysm.    Left middle cerebral artery:  Unremarkable.  No occlusion or   significant stenosis.  No aneurysm.    Left posterior cerebral artery:  Unremarkable.  No occlusion or   significant stenosis.  No aneurysm.    Left vertebral artery:  Unremarkable as visualized.      Basilar artery:  Unremarkable.  No occlusion or significant stenosis.    No aneurysm.     HEAD:    Brain:  No acute findings.  No hemorrhage.  No edema.  Normal   enhancement.    Ventricles:  Unremarkable.  No ventriculomegaly.    Bones joints:  No acute fracture.    Soft tissues:  Unremarkable.     Sinuses:  Unremarkable as visualized.  No acute sinusitis.    Mastoid air cells:  Unremarkable as visualized.  No mastoid effusion.    IMPRESSION:       1.  No acute intracranial abnormality identified.  2.  No significant stenosis, occlusion, or aneurysm of the central or   large intracranial arteries.    Impression:          Electronically signed by Emir Junior M.D. on 11-17-24 at 0242    CT Angiogram Neck [[id number]] Collected: 11/17/24 0240     Updated: 11/17/24 0240    Narrative:        Patient: DENA KIRKLAND  Time Out: 02:39  Exam(s): CTA NECK     EXAM:    CT Angiography Neck With Intravenous Contrast    CLINICAL HISTORY:     Reason for exam: dizziness.    TECHNIQUE:    Routine carotid CT angiography protocol was performed with intravenous   contrast.  NASCET criteria using the distal ICAs for comparison were used   for evaluation of stenoses.  CTDI is 42.59 mGy and DLP is 1545.5 mGy-cm.    This CT exam was performed according to the principle of ALARA (As Low As   Reasonably Achievable) by using one or more of the following dose   reduction techniques: automated exposure control, adjustment of the mA   and or kV according to patient size, and or use of iterative   reconstruction technique.    COMPARISON:    None    FINDINGS:     VASCULATURE:    Right common carotid artery:  Unremarkable.  No occlusion or   significant stenosis.  No dissection.    Right internal carotid artery:  Unremarkable.  Extracranial segment is   patent with no occlusion or significant stenosis.  No dissection.    Right external carotid artery:  Unremarkable.  No occlusion.    Right vertebral artery:  Unremarkable.  No occlusion or significant   stenosis.  No dissection.      Left common carotid artery:  Unremarkable.  No occlusion or significant   stenosis.  No dissection.    Left internal carotid artery:  Unremarkable.  Extracranial segment is   patent with no occlusion or significant stenosis.  No dissection.    Left external  carotid artery:  Unremarkable.  No occlusion.    Left vertebral artery:  Dominant left vertebral artery.  No occlusion   or significant stenosis.  No dissection.     NECK:    Bones joints:  Unremarkable.  No acute fracture.    Soft tissues:  Unremarkable.    Lung apices:  Clear.     CAROTID STENOSIS REFERENCE USING NASCET CRITERIA:    % ICA stenosis = (1 - narrowest ICA diameter diameter of distal   cervical ICA) x 100.    Mild - <50% stenosis.    Moderate - 50-69% stenosis.    Severe - 70-94% stenosis.    Near occlusion - 95-99% stenosis.    Occluded - 100% stenosis.    IMPRESSION:         No significant stenosis, occlusion, or dissection.      Impression:          Electronically signed by Emir Junior M.D. on 11-17-24 at 0239            I have reviewed the medications.     Discharge Medications        ASK your doctor about these medications        Instructions Start Date   albuterol sulfate  (90 Base) MCG/ACT inhaler  Commonly known as: PROVENTIL HFA;VENTOLIN HFA;PROAIR HFA   2 puffs, Inhalation, Every 4 Hours PRN      azithromycin 250 MG tablet  Commonly known as: Zithromax Z-Migel   Take 2 tablets by mouth on day 1, then 1 tablet daily on days 2-5      norgestimate-ethinyl estradiol 0.25-35 MG-MCG per tablet  Commonly known as: Sprintec 28   1 tablet, Oral, Daily      RED YEAST RICE PO   1,200 mg, Daily      sertraline 50 MG tablet  Commonly known as: ZOLOFT   50 mg, Oral, Daily              ---------------------------------------------------------------------------------------------  Assessment & Plan   Assessment/Problem List    Dizziness      Plan:    Dizziness   -NIH 0  -Cardiac monitoring uneventful  -MRI-no acute infarct, hemorrhage, or hydrocephalus.  -CT Head without contrast-no acute intracranial abnormality identified.  -CTA Head/neck no significant stenosis, occlusion, or aneurysm of the central or large intracranial arteries.  -Neuro consult, clear for discharge home    Disposition:  Home    Follow-up after Discharge: PCP & Neurology    This note will serve as a discharge summary    ALONA Stevens 11/17/24 08:30 EST    I have worn appropriate PPE during this patient encounter, sanitized my hands both with entering and exiting patient's room.      37 minutes has been spent by [facility] providers in the care of this patient while under observation status

## 2024-11-17 NOTE — OUTREACH NOTE
Prep Survey      Flowsheet Row Responses   [facility] patient discharged from? [place]   Is LACE score < 7 ? Yes   Eligibility [facility]   Date of Admission 11/17/24   Date of Discharge 11/17/24   Discharge Disposition Home or Self Care   Discharge diagnosis Dizziness  vestibular neuritis   Does the patient have one of the following disease processes/diagnoses(primary or secondary)? Other   Does the patient have Home health ordered? No   Is there a DME ordered? No   Prep survey completed? Yes            SAI KWON - Registered Nurse

## 2024-11-17 NOTE — THERAPY DISCHARGE NOTE
Patient Name: Leonie Gomez  : 1990    MRN: [id number]                              Today's Date: 2024       Admit Date: 2024    Visit Dx:     ICD-10-CM ICD-9-CM   1. Dizziness  R42 780.4     Patient Active Problem List   Diagnosis    Vitamin D deficiency    Seizure    Anxiety    Dehydration    Acute diarrhea    Neck pain    Dizziness     Past Medical History:   Diagnosis Date    Anxiety     COVID     Dysmenorrhea     Seizure 2019     Past Surgical History:   Procedure Laterality Date    BREAST AUGMENTATION  2020    EAR TUBES      REFRACTIVE SURGERY        General Information       Row Name 24 1127          Physical Therapy Time and Intention    Document Type evaluation;discharge evaluation/summary  -MR     Mode of Treatment individual therapy;physical therapy  -MR       Row Name 24 1127          General Information    Patient Profile Reviewed yes  -MR     Prior Level of Function independent:;all household mobility;community mobility  -MR     Existing Precautions/Restrictions no known precautions/restrictions  -MR       Row Name 24 1127          Living Environment    People in Home child(nancy), dependent;spouse  -MR       Row Name 24 1127          Home Main Entrance    Number of Stairs, Main Entrance twelve  -MR     Stair Railings, Main Entrance railings safe and in good condition  -MR       Row Name 24 1127          Cognition    Orientation Status (Cognition) oriented x 4  -MR               User Key  (r) = Recorded By, (t) = Taken By, (c) = Cosigned By      Initials Name Provider Type    Gerri Adams, PT Physical Therapist                   Mobility       Row Name 24 1128          Bed Mobility    Bed Mobility bed mobility (all) activities  -MR     All Activities, [place] (Bed Mobility) independent  -MR       Row Name 24 1128          Bed-Chair Transfer    Bed-Chair [place] (Transfers) independent  -MR       Row Name 24  1128          Sit-Stand Transfer    Sit-Stand [place] (Transfers) independent  -MR       Row Name 11/17/24 1128          Gait/Stairs (Locomotion)    [place] Level (Gait) independent  -MR     Patient was able to Ambulate yes  -MR     Distance in Feet (Gait) 150  -MR     Comment, (Gait/Stairs) Patient able to ambulate without assistive device standby assist from PT no loss of stability or balance noted no gait deviations noted.  -MR               User Key  (r) = Recorded By, (t) = Taken By, (c) = Cosigned By      Initials Name Provider Type     Nagi Douglasn, PT Physical Therapist                   Obj/Interventions       Row Name 11/17/24 1129          Range of Motion Comprehensive    General Range of Motion no range of motion deficits identified;bilateral lower extremity ROM WFL  -MR       Row Name 11/17/24 1129          Strength Comprehensive (MMT)    General Manual Muscle Testing (MMT) Assessment no strength deficits identified  -MR       Row Name 11/17/24 1129          Sensory Assessment (Somatosensory)    Sensory Assessment (Somatosensory) sensation intact  -MR               User Key  (r) = Recorded By, (t) = Taken By, (c) = Cosigned By      Initials Name Provider Type    Nagi Adamsn, PT Physical Therapist                   Goals/Plan    No documentation.                  Clinical Impression       Row Name 11/17/24 1129          Pain    Pretreatment Pain Rating 0/10 - no pain  -MR     Posttreatment Pain Rating 0/10 - no pain  -MR       Row Name 11/17/24 1129          Plan of Care Review    Progress improving  -MR     Outcome Evaluation Patient is a 34-year-old female in observation after reports of dizziness last night.  Patient's PLOF is independent with all functional mobility currently living with  and son in a split-level home.  PT performed a thorough assessment of pt’s musculoskeletal, nervous, cardiovascular, integumentary, respiratory, and digestive system and levels of  functional independence. Examined current levels of functional components including but not limited to bed mobility, functional transfers, gait, seated balance, standing balance, posture, strength, fall risk.  Performed quick screen smooth pursuits with patient noted to have down beating nystagmus to the right, therefore performed Toni-Hallpike testing right posterior canal.  Did not observe any nystagmus nor did patient report any symptoms.  Patient's blood pressure obtained during sitting and standing with readings of 146/96 and 137/110 respectively.  PT educated patient on monitoring blood pressure throughout the week and possibly following up with primary care about possible hypertension.  Additionally PT discussed with patient to follow-up with physical therapy outpatient services for VOR training.  -MR       Row Name 11/17/24 1129          Therapy Assessment/Plan (PT)    Criteria for Skilled Interventions Met (PT) no  -MR     Therapy Frequency (PT) evaluation only  -MR       Row Name 11/17/24 1129          Positioning and Restraints    Pre-Treatment Position in bed  -MR     Post Treatment Position bed  -MR     In Bed call light within reach;side rails up x3;legs elevated  -MR               User Key  (r) = Recorded By, (t) = Taken By, (c) = Cosigned By      Initials Name Provider Type     MisaelGerri, PT Physical Therapist                   Outcome Measures       Row Name 11/17/24 1133 11/17/24 0452       How much help from another person do you currently need...    Turning from your back to your side while in flat bed without using bedrails? 4  -MR 4  -MC    Moving from lying on back to sitting on the side of a flat bed without bedrails? 4  -MR 4  -MC    Moving to and from a bed to a chair (including a wheelchair)? 4  -MR 4  -MC    Standing up from a chair using your arms (e.g., wheelchair, bedside chair)? 4  -MR 4  -MC    Climbing 3-5 steps with a railing? 4  -MR 4  -MC    To walk in hospital room? 4  -MR  4  -    AM-PAC 6 Clicks Score (PT) 24  -MR 24  -MC              User Key  (r) = Recorded By, (t) = Taken By, (c) = Cosigned By      Initials Name Provider Type    Facundo Cabrales, RN Registered Nurse    MR Gerri Douglas, PT Physical Therapist                  Physical Therapy Education       Title: PT OT SLP Therapies (Done)       Topic: Physical Therapy (Done)       Point: Mobility training (Done)       Learning Progress Summary            Patient Acceptance, E, VU by MR at 11/17/2024 1134                      Point: Home exercise program (Done)       Learning Progress Summary            Patient Acceptance, E, VU by MR at 11/17/2024 1134                      Point: Body mechanics (Done)       Learning Progress Summary            Patient Acceptance, E, VU by MR at 11/17/2024 1134                      Point: Precautions (Done)       Learning Progress Summary            Patient Acceptance, E, VU by MR at 11/17/2024 1134                                      User Key       Initials Effective Dates Name Provider Type Discipline    MR 10/09/24 -  Gerri Douglas, PT Physical Therapist PT                  PT Recommendation and Plan     Progress: improving  Outcome Evaluation: Patient is a 34-year-old female in observation after reports of dizziness last night.  Patient's PLOF is independent with all functional mobility currently living with  and son in a split-level home.  PT performed a thorough assessment of pt’s musculoskeletal, nervous, cardiovascular, integumentary, respiratory, and digestive system and levels of functional independence. Examined current levels of functional components including but not limited to bed mobility, functional transfers, gait, seated balance, standing balance, posture, strength, fall risk.  Performed quick screen smooth pursuits with patient noted to have down beating nystagmus to the right, therefore performed Toni-Hallpike testing right posterior canal.  Did not observe any  nystagmus nor did patient report any symptoms.  Patient's blood pressure obtained during sitting and standing with readings of 146/96 and 137/110 respectively.  PT educated patient on monitoring blood pressure throughout the week and possibly following up with primary care about possible hypertension.  Additionally PT discussed with patient to follow-up with physical therapy outpatient services for VOR training.     Time Calculation:   PT Evaluation Complexity  History, PT Evaluation Complexity: no personal factors and/or comorbidities  Examination of Body Systems (PT Eval Complexity): 1-2 elements  Clinical Presentation (PT Evaluation Complexity): stable  Clinical Decision Making (PT Evaluation Complexity): low complexity  Overall Complexity (PT Evaluation Complexity): low complexity     PT Charges       Row Name 11/17/24 1134             Time Calculation    Start Time 0920  -MR      Stop Time 0946  -MR      Time Calculation (min) 26 min  -MR         Timed Charges    51287 -  PT Neuromuscular Reeducation Minutes 16  -MR         Untimed Charges    PT Eval/Re-eval Minutes 10  -MR         Total Minutes    Timed Charges Total Minutes 16  -MR      Untimed Charges Total Minutes 10  -MR       Total Minutes 26  -MR                User Key  (r) = Recorded By, (t) = Taken By, (c) = Cosigned By      Initials Name Provider Type    MR Gerri Douglas, PT Physical Therapist                  Therapy Charges for Today       Code Description Service Date Service Provider Modifiers Qty    22569960133 HC PT NEUROMUSC RE EDUCATION EA 15 MIN 11/17/2024 Gerri Douglas, PT GP 1    70324639273 HC PT EVAL LOW COMPLEXITY 3 11/17/2024 Gerri Douglas, PT GP 1            PT G-Codes  AM-PAC 6 Clicks Score (PT): 24    PT Discharge Summary  Anticipated Discharge Disposition (PT): home with outpatient therapy services    Gerri Douglas, PT  11/17/2024

## 2024-11-17 NOTE — ED NOTES
Nursing report ED to floor  Leonei Gomez  34 y.o.  female    Leonie Gomez is a 34 y.o. female with a medical history of anxiety, seizure disorder who presents to the ED c/o acute dizziness.  Patient states she was lying in bed earlier this evening when she had sudden onset of dizziness.  Patient attempted to go to sleep but dizziness was too intense.  Patient does have associated nausea.  Patient states she has had difficulty with ambulation and is requiring assistance to get around.  Patient denies any unilateral weakness.  Patient denies history of CVA, blood clots.   HPI :  HPI  Stated Reason for Visit: acute onset dizziness that started 30 minutes ago  History Obtained From: patient    Chief Complaint  Chief Complaint   Patient presents with    Dizziness       Admitting doctor:   Nishant Fraga MD    Admitting diagnosis:   The encounter diagnosis was Dizziness.    Code status:   Current Code Status       Date Active Code Status Order ID Comments User Context       11/17/2024 0250 CPR (Attempt to Resuscitate) [id number]  Gretchen Champagne APRN ED        Question Answer    Code Status (Patient has no pulse and is not breathing) CPR (Attempt to Resuscitate)    Medical Interventions (Patient has pulse or is breathing) Full Support                    Allergies:   Amoxicillin and Penicillins    Isolation:   No active isolations    Intake and Output  No intake or output data in the 24 hours ending 11/17/24 0259    Weight:       11/17/24 0133   Weight: 90.7 kg (200 lb)       Most recent vitals:   Vitals:    11/17/24 0134 11/17/24 0151 11/17/24 0231 11/17/24 0232   BP: 138/99 136/99 143/90    BP Location: Right arm      Patient Position: Sitting      Pulse:  90  80   Resp:       Temp:       TempSrc:       SpO2:  99%  97%   Weight:       Height:           Active LDAs/IV Access:   Lines, Drains & Airways       Active LDAs       Name Placement date Placement time Site Days    Peripheral IV 11/17/24 0135 Right  Antecubital 11/17/24  0135  Antecubital  less than 1                    Labs (abnormal labs have a star):   Labs Reviewed   COMPREHENSIVE METABOLIC PANEL - Abnormal; Notable for the following components:       Result Value    Glucose 126 (*)     Potassium 3.1 (*)     CO2 19.8 (*)     Anion Gap 18.2 (*)     All other components within normal limits    Narrative:     GFR Normal >60  Chronic Kidney Disease <60  Kidney Failure <15     APTT - Abnormal; Notable for the following components:    PTT 22.0 (*)     All other components within normal limits   CBC WITH AUTO DIFFERENTIAL - Abnormal; Notable for the following components:    WBC 11.44 (*)     Lymphocytes, Absolute 4.23 (*)     Monocytes, Absolute 0.94 (*)     All other components within normal limits   PROTIME-INR - Normal   SINGLE HS TROPONIN T - Normal    Narrative:     High Sensitive Troponin T Reference Range:  <14.0 ng/L- Negative Female for AMI  <22.0 ng/L- Negative Male for AMI  >=14 - Abnormal Female indicating possible myocardial injury.  >=22 - Abnormal Male indicating possible myocardial injury.   Clinicians would have to utilize clinical acumen, EKG, Troponin, and serial changes to determine if it is an Acute Myocardial Infarction or myocardial injury due to an underlying chronic condition.        MAGNESIUM - Normal   URINALYSIS W/ MICROSCOPIC IF INDICATED (NO CULTURE)   CBC AND DIFFERENTIAL    Narrative:     The following orders were created for panel order CBC & Differential.  Procedure                               Abnormality         Status                     ---------                               -----------         ------                     CBC Auto Differential[[id number]]        Abnormal            Final result                 Please view results for these tests on the individual orders.       EKG:   ECG 12 Lead Tachycardia   Preliminary Result   HEART RATE=74  bpm   RR Wfjsmvpa=727  ms   NJ Iznxrhmr=110  ms   P Horizontal Axis=-5  deg   P  Front Axis=62  deg   QRSD Interval=92  ms   QT Yklepnrs=943  ms   HXrL=497  ms   QRS Axis=49  deg   T Wave Axis=23  deg   - NORMAL ECG -   Sinus rhythm   Date and Time of Study:2024-11-17 02:22:07          Meds given in ED:   Medications   sodium chloride 0.9 % flush 10 mL (has no administration in time range)   sodium chloride 0.9 % flush 10 mL (has no administration in time range)   sodium chloride 0.9 % infusion 40 mL (has no administration in time range)   ondansetron ODT (ZOFRAN-ODT) disintegrating tablet 4 mg (has no administration in time range)     Or   ondansetron (ZOFRAN) injection 4 mg (has no administration in time range)   nitroglycerin (NITROSTAT) SL tablet 0.4 mg (has no administration in time range)   Potassium Replacement - Follow Nurse / BPA Driven Protocol (has no administration in time range)   Magnesium Standard Dose Replacement - Follow Nurse / BPA Driven Protocol (has no administration in time range)   Phosphorus Replacement - Follow Nurse / BPA Driven Protocol (has no administration in time range)   Calcium Replacement - Follow Nurse / BPA Driven Protocol (has no administration in time range)   acetaminophen (TYLENOL) tablet 650 mg (has no administration in time range)     Or   acetaminophen (TYLENOL) 160 MG/5ML oral solution 650 mg (has no administration in time range)     Or   acetaminophen (TYLENOL) suppository 650 mg (has no administration in time range)   sennosides-docusate (PERICOLACE) 8.6-50 MG per tablet 2 tablet (has no administration in time range)     And   polyethylene glycol (MIRALAX) packet 17 g (has no administration in time range)     And   bisacodyl (DULCOLAX) EC tablet 5 mg (has no administration in time range)     And   bisacodyl (DULCOLAX) suppository 10 mg (has no administration in time range)   ondansetron (ZOFRAN) injection 4 mg (4 mg Intravenous Given 11/17/24 0145)   iopamidol (ISOVUE-370) 76 % injection 100 mL (95 mL Intravenous Given 11/17/24 0204)       Imaging  results:  CT Angiogram Head    Result Date: 11/17/2024  Electronically signed by Emir Junior M.D. on 11-17-24 at 0242    CT Angiogram Neck    Result Date: 11/17/2024  Electronically signed by Emir Junior M.D. on 11-17-24 at 0239     Ambulatory status:   - standby assistance    Social issues:   Social History     Socioeconomic History    Marital status:     Number of children: 1   Tobacco Use    Smoking status: Never    Smokeless tobacco: Never   Vaping Use    Vaping status: Never Used   Substance and Sexual Activity    Alcohol use: Yes     Comment: occasionally    Drug use: Never    Sexual activity: Yes     Partners: Male     Birth control/protection: OCP       Peripheral Neurovascular  Peripheral Neurovascular (Adult)  Peripheral Neurovascular WDL: .WDL except, neurovascular assessment upper  LUE Neurovascular Assessment  Temperature LUE: warm  Color LUE: no discoloration  Sensation LUE: tingling present, no numbness, no tenderness (tingling to bilateral hands)  RUE Neurovascular Assessment  Temperature RUE: warm  Color RUE: no discoloration  Sensation RUE: no numbness, tingling present, no tenderness (tingling to bilateral hands)    Neuro Cognitive  Neuro Cognitive (Adult)  Cognitive/Neuro/Behavioral WDL: .WDL except  Additional Documentation: Dizziness Assessment (Group)  NIH Stroke Scale  Interval: baseline  1a. Level of Consciousness: 0-->Alert, keenly responsive  1b. LOC Questions: 0-->Answers both questions correctly  1c. LOC Commands: 0-->Performs both tasks correctly  2. Best Gaze: 0-->Normal  3. Visual: 0-->No visual loss  4. Facial Palsy: 0-->Normal symmetrical movements  5a. Motor Arm, Left: 0-->No drift, limb holds 90 (or 45) degrees for full 10 secs  5b. Motor Arm, Right: 0-->No drift, limb holds 90 (or 45) degrees for full 10 secs  6a. Motor Leg, Left: 0-->No drift, leg holds 30 degree position for full 5 secs  6b. Motor Leg, Right: 0-->No drift, leg holds 30 degree position for full 5  secs  7. Limb Ataxia: 0-->Absent  8. Sensory: 0-->Normal, no sensory loss  9. Best Language: 0-->No aphasia, normal  10. Dysarthria: 0-->Normal  11. Extinction and Inattention (formerly Neglect): 0-->No abnormality  Total (NIH Stroke Scale): 0  Dizziness Assessment  Dizziness Reported Symptoms: constant, environment spinning, unsteady    Learning  Learning Assessment  Learning Readiness and Ability: no barriers identified  Education Provided  Person Taught: patient, spouse  Teaching Method: verbal instruction  Teaching Focus: symptom/problem overview, diagnostic test  Education Outcome Evaluation: verbalizes understanding    Respiratory  Respiratory WDL  Respiratory WDL: .WDL except, rhythm/pattern  Rhythm/Pattern, Respiratory: shortness of breath    Abdominal Pain       Pain Assessments  Pain (Adult)  (0-10) Pain Rating: Rest: 0  (0-10) Pain Rating: Activity: 0    NIH Stroke Scale  NIH Stroke Scale  Interval: baseline  1a. Level of Consciousness: 0-->Alert, keenly responsive  1b. LOC Questions: 0-->Answers both questions correctly  1c. LOC Commands: 0-->Performs both tasks correctly  2. Best Gaze: 0-->Normal  3. Visual: 0-->No visual loss  4. Facial Palsy: 0-->Normal symmetrical movements  5a. Motor Arm, Left: 0-->No drift, limb holds 90 (or 45) degrees for full 10 secs  5b. Motor Arm, Right: 0-->No drift, limb holds 90 (or 45) degrees for full 10 secs  6a. Motor Leg, Left: 0-->No drift, leg holds 30 degree position for full 5 secs  6b. Motor Leg, Right: 0-->No drift, leg holds 30 degree position for full 5 secs  7. Limb Ataxia: 0-->Absent  8. Sensory: 0-->Normal, no sensory loss  9. Best Language: 0-->No aphasia, normal  10. Dysarthria: 0-->Normal  11. Extinction and Inattention (formerly Neglect): 0-->No abnormality  Total (NIH Stroke Scale): 0    Steffi Boswell RN  11/17/24 02:59 EST

## 2024-11-17 NOTE — PLAN OF CARE
Goal Outcome Evaluation:      Pt. Admitted to observation from the ED with dizziness. Pt reports improvement of symptoms. MRI completed. Potassium 3.1, Potassium replacement ordered. NIH 0. Neuro and PT consult ordered.

## 2024-11-18 ENCOUNTER — TRANSITIONAL CARE MANAGEMENT TELEPHONE ENCOUNTER (OUTPATIENT)
Dept: CALL CENTER | Facility: HOSPITAL | Age: 34
End: 2024-11-18
Payer: OTHER GOVERNMENT

## 2024-11-18 NOTE — CASE MANAGEMENT/SOCIAL WORK
Case Management Discharge Note      Final Note: Discharge to home. Shey VARNER RN CCP         Selected Continued Care - Discharged on 11/17/2024 Admission date: 11/17/2024 - Discharge disposition: Home or Self Care      Destination    No services have been selected for the patient.                Durable Medical Equipment    No services have been selected for the patient.                Dialysis/Infusion    No services have been selected for the patient.                Home Medical Care    No services have been selected for the patient.                Therapy    No services have been selected for the patient.                Community Resources    No services have been selected for the patient.                Community & DME    No services have been selected for the patient.                    Transportation Services  Private: Car    Final Discharge Disposition Code: 01 - home or self-care

## 2024-11-18 NOTE — OUTREACH NOTE
Call Center TCM Note      Flowsheet Row Responses   [facility] patient discharged from? [place]   Does the patient have one of the following disease processes/diagnoses(primary or secondary)? Other   TCM attempt successful? Yes   Call start time 1201   Call end time 1206   Discharge diagnosis Dizziness  vestibular neuritis   Meds reviewed with patient/caregiver? Yes   Is the patient having any side effects they believe may be caused by any medication additions or changes? No   Does the patient have all medications ordered at discharge? N/A   Is the patient taking all medications as directed (includes completed medication regime)? Yes   Comments Hospital d/c f/u appt on 11/25/24 @7:45am   Does the patient have an appointment with their PCP within 7-14 days of discharge? Yes   Has home health visited the patient within 72 hours of discharge? N/A   Psychosocial issues? No   Did the patient receive a copy of their discharge instructions? Yes   Nursing interventions Reviewed instructions with patient   What is the patient's perception of their health status since discharge? Improving  [still has some dizziness with exertion, however the vertigo has improved]   Is the patient/caregiver able to teach back the hierarchy of who to call/visit for symptoms/problems? PCP, Specialist, Home health nurse, Urgent Care, ED, 911 Yes   TCM call completed? Yes   Call end time 1206   Would this patient benefit from a Referral to Amb Social Work? No   Is the patient interested in additional calls from an ambulatory ? No            Di Gallagher RN    11/18/2024, 12:07 EST

## 2024-11-20 ENCOUNTER — OFFICE VISIT (OUTPATIENT)
Facility: HOSPITAL | Age: 34
End: 2024-11-20
Payer: COMMERCIAL

## 2024-11-20 VITALS — WEIGHT: 204.4 LBS | OXYGEN SATURATION: 97 % | BODY MASS INDEX: 32.85 KG/M2 | HEIGHT: 66 IN | HEART RATE: 94 BPM

## 2024-11-20 DIAGNOSIS — R06.81 APNEIC: ICD-10-CM

## 2024-11-20 DIAGNOSIS — R42 DIZZINESS: ICD-10-CM

## 2024-11-20 DIAGNOSIS — I10 HYPERTENSION, UNSPECIFIED TYPE: ICD-10-CM

## 2024-11-20 DIAGNOSIS — R06.83 SNORING: Primary | ICD-10-CM

## 2024-11-20 DIAGNOSIS — R56.9 SEIZURE: ICD-10-CM

## 2024-11-20 PROCEDURE — G0463 HOSPITAL OUTPT CLINIC VISIT: HCPCS
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
## 2024-11-25 ENCOUNTER — OFFICE VISIT (OUTPATIENT)
Dept: INTERNAL MEDICINE | Age: 34
End: 2024-11-25
Payer: OTHER GOVERNMENT

## 2024-11-25 VITALS
OXYGEN SATURATION: 97 % | TEMPERATURE: 97.6 F | HEART RATE: 86 BPM | RESPIRATION RATE: 18 BRPM | WEIGHT: 207 LBS | DIASTOLIC BLOOD PRESSURE: 84 MMHG | HEIGHT: 66 IN | SYSTOLIC BLOOD PRESSURE: 128 MMHG | BODY MASS INDEX: 33.27 KG/M2

## 2024-11-25 DIAGNOSIS — H81.20 VESTIBULAR NEURONITIS, UNSPECIFIED LATERALITY: Primary | ICD-10-CM

## 2024-11-25 DIAGNOSIS — Z23 NEEDS FLU SHOT: ICD-10-CM

## 2024-11-25 PROCEDURE — 90656 IIV3 VACC NO PRSV 0.5 ML IM: CPT | Performed by: PHYSICIAN ASSISTANT

## 2024-11-25 PROCEDURE — 99495 TRANSJ CARE MGMT MOD F2F 14D: CPT | Performed by: PHYSICIAN ASSISTANT

## 2024-11-25 PROCEDURE — 90471 IMMUNIZATION ADMIN: CPT | Performed by: PHYSICIAN ASSISTANT

## 2024-11-25 NOTE — PROGRESS NOTES
"                             SHANTEL OREILLY PA-C                 I  N  T  E  R  N  A  L    M  E  D  I  C  I  N  E         ENCOUNTER DATE:  11/25/2024    Leonie Jason / 34 y.o. / female      TRANSITIONAL CARE MANAGEMENT ENCOUNTER       CHIEF COMPLAINT / REASON FOR OFFICE VISIT     Transitional Care Management (11/17/2024- dizziness, vestibular neuritis//Pt is not currently on any medication. )        Within 48 business hours after discharge our office contacted him via telephone to coordinate his care and needs.      I reviewed and discussed the details of that call along with the discharge summary, hospital problems, inpatient lab results, inpatient diagnostic studies, and consultation reports with the patient.         11/17/2024     3:10 PM   Date of TCM Phone Call   [facility]   Date of Admission 11/17/2024   Date of Discharge 11/17/2024   Discharge Disposition Home or Self Care       Risk for Readmission (LACE) Score: 1 (11/17/2024  6:00 AM)          VITAL SIGNS     Vitals:    11/25/24 0737   BP: 128/84   BP Location: Left arm   Patient Position: Sitting   Cuff Size: Adult   Pulse: 86   Resp: 18   Temp: 97.6 °F (36.4 °C)   TempSrc: Temporal   SpO2: 97%   Weight: 93.9 kg (207 lb)   Height: 167.5 cm (65.95\")       BP Readings from Last 3 Encounters:   11/25/24 128/84   11/17/24 134/84   09/06/24 130/78     Wt Readings from Last 3 Encounters:   11/25/24 93.9 kg (207 lb)   11/20/24 92.7 kg (204 lb 6.4 oz)   11/17/24 94.8 kg (209 lb)      Body mass index is 33.47 kg/m².     HISTORY OF PRESENT ILLNESS     Date of admission/discharge: As noted above in CC  Hospital: [facility]   Principle Dx: Dizziness.  Secondary Dx: Vestibular Neuritis.   History prior to hospitalization: Seizure, Depression, Anxiety, Vitamin D Deficiency.   Evaluation/Treatment: Presented to ED on 11/17/24 with symptoms sudden onset headache, room spinning dizziness, nausea, and vomiting. Patient states that she " was laying in bed scrolling on her phone when she had a sudden onset of a headache. She states she then became dizzy and started vomiting. She denies having a history of migraine headaches or having dizziness in the past. She states she was unable to walk due to the dizziness.  She is feeling better now. Denies associated vision changes, chest pain, shortness of breath, abdominal pain, or numbness/tingling/weakness in her extremities. Denies history of vertigo. MRI demonstrated no acute infarct, hemorrhage, or hydrocephalus. CT Head without contrast-no acute intracranial abnormality identified. CTA Head/neck no significant stenosis, occlusion, or aneurysm of the central or large intracranial arteries. Potassium 3.1. White blood cell count 11.4. Hemoglobin 13.4.  Low potassium treated with electrolyte replacement protocol repletion.   Course: Discharged hemodynamically stable following 11 hours in observation. Denies dizziness or nausea on today. Denies any return of her nausea symptoms following discharge since initial incident.     Hypokalemia:  No corrected labs on file following correction of low Potassium while inpatient. Patient is already scheduled for annual labs on tomorrow, and will have electrolyte level updated.     Needs Flu Vaccine: Requesting annual influenza vaccination.         Patient Care Team:  José Luis Dixon DNP, [place] as PCP - General (Internal Medicine)  Sarwat Cadena MD as Consulting Physician (Obstetrics and Gynecology)    ASSESSMENT & PLAN     1. Vestibular neuronitis, unspecified laterality    2. Needs flu shot      Orders Placed This Encounter   Procedures    Fluzone >6mos ([id number])       Summary/Discussion:  Dizziness symptoms have completely resolved. No intervention requested.   Flu vaccine administered on today.     Return if symptoms worsen or fail to improve. And, for Follow-up with José Luis Dixon DNP, APRN 7/25/25.    REVIEW OF SYSTEMS     Review of Systems   Constitutional: Negative.     HENT: Negative.     Respiratory: Negative.     Cardiovascular: Negative.    Gastrointestinal: Negative.    Genitourinary: Negative.    Musculoskeletal: Negative.    All other systems reviewed and are negative.    As per HPI    PHYSICAL EXAMINATION     Physical Exam  Vitals and nursing note reviewed.   Constitutional:       General: She is not in acute distress.     Appearance: Normal appearance. She is not ill-appearing.   Cardiovascular:      Rate and Rhythm: Normal rate and regular rhythm.      Pulses: Normal pulses.      Heart sounds: Normal heart sounds. No murmur heard.     No friction rub. No gallop.   Pulmonary:      Effort: Pulmonary effort is normal. No respiratory distress.      Breath sounds: Normal breath sounds. No stridor. No wheezing.   Neurological:      Mental Status: She is alert.   Psychiatric:         Mood and Affect: Mood normal.         Behavior: Behavior normal.           REVIEWED DATA     Labs:   Lab Results   Component Value Date     11/17/2024    K 3.1 (L) 11/17/2024    CALCIUM 8.9 11/17/2024    AST 10 11/17/2024    ALT 10 11/17/2024    BUN 12 11/17/2024    CREATININE 0.82 11/17/2024    CREATININE 0.73 07/16/2024    CREATININE 0.70 07/14/2023    EGFRIFNONA 82 02/05/2021    EGFRIFAFRI 100 02/05/2021       Lab Results   Component Value Date    WBC 11.44 (H) 11/17/2024    HGB 13.4 11/17/2024    HGB 14.0 07/16/2024    HGB 13.1 07/14/2023     11/17/2024       Lab Results   Component Value Date    PROTEIN Trace 07/16/2024    GLUCOSEU Negative 11/17/2024    BLOODU Negative 11/17/2024    NITRITEU Negative 11/17/2024    LEUKOCYTESUR Trace (A) 11/17/2024           Imaging:   MRI Brain Without Contrast    Result Date: 11/17/2024  Narrative: Patient: DENA KIRKLAND  Time Out: 04:25 Exam(s): MRI HEAD Without Contrast EXAM:   MR Head Without Intravenous Contrast CLINICAL HISTORY:    Reason for exam: dizziness. TECHNIQUE:   Magnetic resonance images of the head brain without intravenous  contrast in multiple planes. COMPARISON:   No relevant prior studies available. FINDINGS:   Brain:  No mass.  No acute hemorrhage.  No restricted diffusion.   Ventricles:  No hydrocephalus.   Bones joints:  Unremarkable.   Sinuses:  No acute sinusitis.   Mastoid air cells:  No effusion.   Orbits:  Unremarkable. IMPRESSION:       No acute infarct, hemorrhage, or hydrocephalus.     Impression: Electronically signed by Gomez Akers MD on 11-17-24 at 0425    CT Angiogram Head    Result Date: 11/17/2024  Narrative: Patient: DENA KIRKLAND  Time Out: 02:42 Exam(s): CTA HEAD EXAM:   CT Angiography Head Without and With Intravenous Contrast CLINICAL HISTORY:    Reason for exam: dizziness. TECHNIQUE: AI analysis of LVO was utilized   Axial computed tomographic angiography images of the head without and with intravenous contrast.  CTDI is 42.59 mGy and DLP is 1545.5 mGy-cm.  This CT exam was performed according to the principle of ALARA (As Low As Reasonably Achievable) by using one or more of the following dose reduction techniques: automated exposure control, adjustment of the mA and or kV according to patient size, and or use of iterative reconstruction technique.   MIP reconstructed images were created and reviewed. COMPARISON:   None FINDINGS:  VASCULATURE:   Right internal carotid artery:  No acute findings.  Intracranial segment is patent with no significant stenosis.  No aneurysm.   Right anterior cerebral artery:  Unremarkable.  No occlusion or significant stenosis.  No aneurysm.   Right middle cerebral artery:  Unremarkable.  No occlusion or significant stenosis.  No aneurysm.   Right posterior cerebral artery:  Unremarkable.  No occlusion or significant stenosis.  No aneurysm.   Right vertebral artery:  The right vertebral artery appears to terminate as the PICA, normal variant.   Left internal carotid artery:  No acute findings.  Intracranial segment is patent with no significant stenosis.  No aneurysm.    Left anterior cerebral artery:  Unremarkable.  No occlusion or significant stenosis.  No aneurysm.   Left middle cerebral artery:  Unremarkable.  No occlusion or significant stenosis.  No aneurysm.   Left posterior cerebral artery:  Unremarkable.  No occlusion or significant stenosis.  No aneurysm.   Left vertebral artery:  Unremarkable as visualized.   Basilar artery:  Unremarkable.  No occlusion or significant stenosis.  No aneurysm.  HEAD:   Brain:  No acute findings.  No hemorrhage.  No edema.  Normal enhancement.   Ventricles:  Unremarkable.  No ventriculomegaly.   Bones joints:  No acute fracture.   Soft tissues:  Unremarkable.   Sinuses:  Unremarkable as visualized.  No acute sinusitis.   Mastoid air cells:  Unremarkable as visualized.  No mastoid effusion. IMPRESSION:     1.  No acute intracranial abnormality identified. 2.  No significant stenosis, occlusion, or aneurysm of the central or large intracranial arteries.    Impression: Electronically signed by Emir Junior M.D. on 11-17-24 at 0242    CT Angiogram Neck    Result Date: 11/17/2024  Narrative: Patient: DENA KIRKLAND  Time Out: 02:39 Exam(s): CTA NECK EXAM:   CT Angiography Neck With Intravenous Contrast CLINICAL HISTORY:    Reason for exam: dizziness. TECHNIQUE:   Routine carotid CT angiography protocol was performed with intravenous contrast.  NASCET criteria using the distal ICAs for comparison were used for evaluation of stenoses.  CTDI is 42.59 mGy and DLP is 1545.5 mGy-cm.  This CT exam was performed according to the principle of ALARA (As Low As Reasonably Achievable) by using one or more of the following dose reduction techniques: automated exposure control, adjustment of the mA and or kV according to patient size, and or use of iterative reconstruction technique. COMPARISON:   None FINDINGS:  VASCULATURE:   Right common carotid artery:  Unremarkable.  No occlusion or significant stenosis.  No dissection.   Right internal carotid  artery:  Unremarkable.  Extracranial segment is patent with no occlusion or significant stenosis.  No dissection.   Right external carotid artery:  Unremarkable.  No occlusion.   Right vertebral artery:  Unremarkable.  No occlusion or significant stenosis.  No dissection.   Left common carotid artery:  Unremarkable.  No occlusion or significant stenosis.  No dissection.   Left internal carotid artery:  Unremarkable.  Extracranial segment is patent with no occlusion or significant stenosis.  No dissection.   Left external carotid artery:  Unremarkable.  No occlusion.   Left vertebral artery:  Dominant left vertebral artery.  No occlusion or significant stenosis.  No dissection.  NECK:   Bones joints:  Unremarkable.  No acute fracture.   Soft tissues:  Unremarkable.   Lung apices:  Clear.  CAROTID STENOSIS REFERENCE USING NASCET CRITERIA:   % ICA stenosis = (1 - narrowest ICA diameter diameter of distal cervical ICA) x 100.   Mild - <50% stenosis.   Moderate - 50-69% stenosis.   Severe - 70-94% stenosis.   Near occlusion - 95-99% stenosis.   Occluded - 100% stenosis. IMPRESSION:       No significant stenosis, occlusion, or dissection.     Impression: Electronically signed by Emir Junior M.D. on 11-17-24 at 0239                  Medical Tests:                  Summary of old records / correspondence / consultant report:   H&P, DC summary re: issues addressed on HPI, Consultation notes: Neurology/Hospitalist, and ED encounter note re: issues addressed on HPI              Request outside records:         MEDICATIONS AT THE TIME OF OFFICE VISIT     Current Outpatient Medications on File Prior to Visit   Medication Sig    norgestimate-ethinyl estradiol (Sprintec 28) 0.25-35 MG-MCG per tablet Take 1 tablet by mouth Daily.    Red Yeast Rice Extract (RED YEAST RICE PO) Take 1,200 mg by mouth Daily.    sertraline (ZOLOFT) 50 MG tablet Take 1 tablet by mouth Daily.    [DISCONTINUED] albuterol sulfate  (90 Base) MCG/ACT  inhaler Inhale 2 puffs Every 4 (Four) Hours As Needed for Wheezing (cough).    [DISCONTINUED] azithromycin (Zithromax Z-Migel) 250 MG tablet Take 2 tablets by mouth on day 1, then 1 tablet daily on days 2-5     No current facility-administered medications on file prior to visit.        Current outpatient and discharge medications have been reconciled for the patient.  Reviewed by: Dmitry Carrion PA-C

## 2024-11-25 NOTE — LETTER
[facility]  Vaccine Consent Form    Patient Name:  Leonie Gomez  Patient :  1990     Vaccine(s) Ordered    Fluzone >6mos ([id number])        Screening Checklist  The following questions should be completed prior to vaccination. If you answer “yes” to any question, it does not necessarily mean you should not be vaccinated. It just means we may need to clarify or ask more questions. If a question is unclear, please ask your healthcare provider to explain it.    Yes No   Any fever or moderate to severe illness today (mild illness and/or antibiotic treatment are not contraindications)?     Do you have a history of a serious reaction to any previous vaccinations, such as anaphylaxis, encephalopathy within 7 days, Guillain-[place] syndrome within 6 weeks, seizure?     Have you received any live vaccine(s) (e.g MMR, BOLA) or any other vaccines in the last month (to ensure duplicate doses aren't given)?     Do you have an anaphylactic allergy to latex (DTaP, DTaP-IPV, Hep A, Hep B, MenB, RV, Td, Tdap), baker’s yeast (Hep B, HPV), polysorbates (RSV, nirsevimab, PCV 20, Rotavirrus, Tdap, Shingrix), or gelatin (BOLA, MMR)?     Do you have an anaphylactic allergy to neomycin (Rabies, BOLA, MMR, IPV, Hep A), polymyxin B (IPV), or streptomycin (IPV)?      Any cancer, leukemia, AIDS, or other immune system disorder? (BOLA, MMR, RV)     Do you have a parent, brother, or sister with an immune system problem (if immune competence of vaccine recipient clinically verified, can proceed)? (MMR, BOLA)     Any recent steroid treatments for >2 weeks, chemotherapy, or radiation treatment? (BOLA, MMR)     Have you received antibody-containing blood transfusions or IVIG in the past 11 months (recommended interval is dependent on product)? (MMR, BOLA)     Have you taken antiviral drugs (acyclovir, famciclovir, valacyclovir for BOLA) in the last 24 or 48 hours, respectively?      Are you pregnant or planning to become pregnant within 1  "month? (BOLA, MMR, HPV, IPV, MenB, Abrexvy; For Hep B- refer to Engerix-B; For RSV - Abrysvo is indicated for 32-36 weeks of pregnancy from September to January)     For infants, have you ever been told your child has had intussusception or a medical emergency involving obstruction of the intestine (Rotavirus)? If not for an infant, can skip this question.         *Ordering Physicians/APC should be consulted if \"yes\" is checked by the patient or guardian above.  I have received, read, and understand the Vaccine Information Statement (VIS) for each vaccine ordered.  I have considered my or my child's health status as well as the health status of my close contacts.  I have taken the opportunity to discuss my vaccine questions with my or my child's health care provider.   I have requested that the ordered vaccine(s) be given to me or my child.  I understand the benefits and risks of the vaccines.  I understand that I should remain in the clinic for 15 minutes after receiving the vaccine(s).  _________________________________________________________  Signature of Patient or Parent/Legal Guardian ____________________  Date     "

## 2024-12-04 ENCOUNTER — HOSPITAL ENCOUNTER (OUTPATIENT)
Dept: SLEEP MEDICINE | Facility: HOSPITAL | Age: 34
Discharge: HOME | End: 2024-12-04
Admitting: PSYCHIATRY & NEUROLOGY
Payer: COMMERCIAL

## 2024-12-04 DIAGNOSIS — R06.81 APNEIC: ICD-10-CM

## 2024-12-04 DIAGNOSIS — R56.9 SEIZURE: ICD-10-CM

## 2024-12-04 DIAGNOSIS — R42 DIZZINESS: ICD-10-CM

## 2024-12-04 DIAGNOSIS — I10 HYPERTENSION, UNSPECIFIED TYPE: ICD-10-CM

## 2024-12-04 DIAGNOSIS — R06.83 SNORING: ICD-10-CM

## 2024-12-04 PROCEDURE — G0399 HOME SLEEP TEST/TYPE 3 PORTA: HCPCS

## 2024-12-10 ENCOUNTER — TELEPHONE (OUTPATIENT)
Dept: SLEEP MEDICINE | Facility: HOSPITAL | Age: 34
End: 2024-12-10
Payer: COMMERCIAL

## 2024-12-10 DIAGNOSIS — I10 HYPERTENSION, UNSPECIFIED TYPE: Primary | ICD-10-CM

## 2024-12-10 DIAGNOSIS — G47.19 EXCESSIVE DAYTIME SLEEPINESS: ICD-10-CM

## 2024-12-10 DIAGNOSIS — G47.33 OSA (OBSTRUCTIVE SLEEP APNEA): ICD-10-CM

## 2024-12-10 NOTE — TELEPHONE ENCOUNTER
I called Pt and lvm going over sleep report and let  Pt knw that Dr Rajan had suggested doing a in lab split night study. I LVM letting Pt know to call us back if she wanted to schedule split night study

## 2025-02-18 ENCOUNTER — OFFICE VISIT (OUTPATIENT)
Dept: OBSTETRICS AND GYNECOLOGY | Age: 35
End: 2025-02-18
Payer: COMMERCIAL

## 2025-02-18 VITALS
DIASTOLIC BLOOD PRESSURE: 70 MMHG | HEIGHT: 66 IN | WEIGHT: 209 LBS | BODY MASS INDEX: 33.59 KG/M2 | SYSTOLIC BLOOD PRESSURE: 114 MMHG

## 2025-02-18 DIAGNOSIS — N94.6 DYSMENORRHEA: ICD-10-CM

## 2025-02-18 DIAGNOSIS — Z30.41 ORAL CONTRACEPTIVE PILL SURVEILLANCE: ICD-10-CM

## 2025-02-18 DIAGNOSIS — Z01.419 WELL WOMAN EXAM WITH ROUTINE GYNECOLOGICAL EXAM: Primary | ICD-10-CM

## 2025-02-18 DIAGNOSIS — Z12.4 SCREENING FOR CERVICAL CANCER: ICD-10-CM

## 2025-02-18 RX ORDER — NORGESTIMATE AND ETHINYL ESTRADIOL 0.25-0.035
1 KIT ORAL DAILY
Qty: 84 TABLET | Refills: 4 | Status: SHIPPED | OUTPATIENT
Start: 2025-02-18

## 2025-02-18 NOTE — PROGRESS NOTES
Subjective     Chief Complaint   Patient presents with    Gynecologic Exam     Annual. Last pap 2024 neg/hpv neg  Cc:  no problems       History of Present Illness    Leonie Gomez is a 35 y.o.  who presents for annual exam.    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         Current contraception: OCP (estrogen/progesterone)  History of abnormal Pap smear: no  Received Gardasil immunization: no  Perform regular self breast exam: yes - occl  Family history of uterine or ovarian cancer: no  Family History of colon cancer: no  Family history of breast cancer: no    Mammogram: not indicated.  Colonoscopy: not indicated.  DEXA: not indicated.    Exercise: moderately active  Calcium/Vitamin D: adequate intake    The following portions of the patient's history were reviewed and updated as appropriate: allergies, current medications, past family history, past medical history, past social history, past surgical history, and problem list.    Review of Systems   Constitutional: Negative.    Respiratory: Negative.     Cardiovascular: Negative.    Gastrointestinal: Negative.    Genitourinary: Negative.    Skin: Negative.    Psychiatric/Behavioral: Negative.             Objective   Physical Exam  Constitutional:       General: She is awake.      Appearance: Normal appearance. She is well-developed. She is obese.   HENT:      Head: Normocephalic and atraumatic.      Nose: Nose normal.   Neck:      Thyroid: No thyroid mass, thyromegaly or  thyroid tenderness.   Cardiovascular:      Rate and Rhythm: Normal rate and regular rhythm.      Pulses: Normal pulses.      Heart sounds: Normal heart sounds.   Pulmonary:      Effort: Pulmonary effort is normal.      Breath sounds: Normal breath sounds.   Chest:   Breasts:     Breasts are symmetrical.      Right: Normal. No swelling, bleeding, inverted nipple, mass, nipple discharge, skin change or tenderness.      Left: Normal. No swelling, bleeding, inverted nipple, mass, nipple discharge, skin change or tenderness.   Abdominal:      General: Abdomen is flat. Bowel sounds are normal.      Palpations: Abdomen is soft.      Tenderness: There is no abdominal tenderness.   Genitourinary:     General: Normal vulva.      Labia:         Right: No rash, tenderness, lesion or injury.         Left: No rash, tenderness, lesion or injury.       Urethra: No prolapse, urethral pain, urethral swelling or urethral lesion.      Vagina: Normal. No signs of injury. No vaginal discharge, erythema, tenderness, bleeding, lesions or prolapsed vaginal walls.      Cervix: Normal. No discharge, friability, lesion, erythema or cervical bleeding.      Uterus: Normal. Not enlarged, not tender and no uterine prolapse.       Adnexa: Right adnexa normal and left adnexa normal.        Right: No mass, tenderness or fullness.          Left: No mass, tenderness or fullness.        Rectum: Normal. No mass.   Musculoskeletal:      Cervical back: Normal range of motion and neck supple.   Lymphadenopathy:      Upper Body:      Right upper body: No supraclavicular adenopathy.      Left upper body: No supraclavicular adenopathy.   Skin:     General: Skin is warm and dry.   Neurological:      General: No focal deficit present.      Mental Status: She is alert and oriented to person, place, and time.   Psychiatric:         Mood and Affect: Mood normal.         Behavior: Behavior normal. Behavior is cooperative.         Thought Content: Thought content  "normal.         Judgment: Judgment normal.         /70   Ht 167.6 cm (66\")   Wt 94.8 kg (209 lb)   LMP 01/28/2025   BMI 33.73 kg/m²     Assessment & Plan   Diagnoses and all orders for this visit:    1. Well woman exam with routine gynecological exam (Primary)    2. Screening for cervical cancer  -     IGP, Apt HPV,rfx 16 / 18,45    3. Oral contraceptive pill surveillance    4. Dysmenorrhea  -     norgestimate-ethinyl estradiol (Sprintec 28) 0.25-35 MG-MCG per tablet; Take 1 tablet by mouth Daily.  Dispense: 84 tablet; Refill: 4        All questions answered.  Breast self exam technique reviewed and patient encouraged to perform self-exam monthly.  Discussed healthy lifestyle modifications.  Recommended 30 minutes of aerobic exercise five times per week.  Discussed calcium needs to prevent osteoporosis.    F/u 1 year               "

## 2025-02-20 LAB
CYTOLOGIST CVX/VAG CYTO: NORMAL
CYTOLOGY CVX/VAG DOC CYTO: NORMAL
CYTOLOGY CVX/VAG DOC THIN PREP: NORMAL
DX ICD CODE: NORMAL
HPV I/H RISK 4 DNA CVX QL PROBE+SIG AMP: NEGATIVE
OTHER STN SPEC: NORMAL
SERVICE CMNT-IMP: NORMAL
STAT OF ADQ CVX/VAG CYTO-IMP: NORMAL

## 2025-03-11 ENCOUNTER — PATIENT ROUNDING (BHMG ONLY) (OUTPATIENT)
Age: 35
End: 2025-03-11
Payer: OTHER GOVERNMENT

## 2025-03-11 NOTE — ED NOTES
Thank you for letting us care for you in your recent visit to our [facility] urgent care center. We would love to hear about your experience with us. Was this the first time you have visited our location?         We’re always looking for ways to make our patients’ experiences even better. Do you have any recommendations on ways we may improve?         I appreciate you taking the time to respond. Please be on the lookout for a survey about your recent visit from [employer] via text or email. We would greatly appreciate if you could fill that out and turn it back in. We want your voice to be heard and we value your feedback.    Thank you for choosing [facility] for your healthcare needs.         If you have concerns or would like to speak to me in person regarding your visit please feel free to give me a call. [phone]         Hope you get well soon and thank you.         Ria Richmond  Practice Manager

## 2025-03-24 DIAGNOSIS — N94.6 DYSMENORRHEA: ICD-10-CM

## 2025-03-25 RX ORDER — NORGESTIMATE AND ETHINYL ESTRADIOL 0.25-0.035
1 KIT ORAL DAILY
Qty: 84 TABLET | Refills: 4 | Status: SHIPPED | OUTPATIENT
Start: 2025-03-25

## 2025-04-14 DIAGNOSIS — N94.6 DYSMENORRHEA: ICD-10-CM

## 2025-04-15 RX ORDER — NORGESTIMATE AND ETHINYL ESTRADIOL 0.25-0.035
1 KIT ORAL DAILY
Qty: 84 TABLET | Refills: 4 | Status: SHIPPED | OUTPATIENT
Start: 2025-04-15

## 2025-07-18 ENCOUNTER — OFFICE VISIT (OUTPATIENT)
Dept: INTERNAL MEDICINE | Age: 35
End: 2025-07-18
Payer: COMMERCIAL

## 2025-07-18 VITALS
DIASTOLIC BLOOD PRESSURE: 88 MMHG | HEART RATE: 69 BPM | OXYGEN SATURATION: 98 % | SYSTOLIC BLOOD PRESSURE: 132 MMHG | WEIGHT: 175.2 LBS | TEMPERATURE: 97.3 F | HEIGHT: 66 IN | BODY MASS INDEX: 28.16 KG/M2

## 2025-07-18 DIAGNOSIS — Z00.00 ENCOUNTER FOR ANNUAL PHYSICAL EXAM: Primary | ICD-10-CM

## 2025-07-18 DIAGNOSIS — E55.9 VITAMIN D DEFICIENCY: ICD-10-CM

## 2025-07-18 DIAGNOSIS — F41.9 ANXIETY: ICD-10-CM

## 2025-07-18 PROCEDURE — 99395 PREV VISIT EST AGE 18-39: CPT | Performed by: NURSE PRACTITIONER

## 2025-07-18 NOTE — PROGRESS NOTES
"                     S K Y L E R    F R Y E ,   D N P ,  A P R N                  I  N  T  E  R  N  A  L    M  E  D  I  C  I  N  E       ENCOUNTER DATE:  07/18/2025    Leonie Heath / 35 y.o. / female    ANNUAL PREVENTATIVE / PHYSICAL ENCOUNTER     CHIEF COMPLAINT     Annual Exam    VITALS     Vitals:    07/18/25 0924   BP: 132/88   Pulse: 69   Temp: 97.3 °F (36.3 °C)   SpO2: 98%   Weight: 79.5 kg (175 lb 3.2 oz)   Height: 167.6 cm (65.98\")       BP Readings from Last 3 Encounters:   07/18/25 132/88   03/10/25 140/90   02/18/25 114/70     Wt Readings from Last 3 Encounters:   07/18/25 79.5 kg (175 lb 3.2 oz)   03/10/25 89.6 kg (197 lb 8 oz)   02/18/25 94.8 kg (209 lb)      Body mass index is 28.29 kg/m².      MEDICATIONS     Current Outpatient Medications on File Prior to Visit   Medication Sig Dispense Refill    norgestimate-ethinyl estradiol (Sprintec 28) 0.25-35 MG-MCG per tablet Take 1 tablet by mouth Daily. 84 tablet 4    Red Yeast Rice Extract (RED YEAST RICE PO) Take 1,200 mg by mouth Daily.      Tirzepatide 2.5 MG/0.5ML solution auto-injector Inject 5 mg under the skin into the appropriate area as directed 1 (One) Time Per Week.      [DISCONTINUED] sertraline (ZOLOFT) 50 MG tablet Take 1 tablet by mouth Daily. 90 tablet 3    [DISCONTINUED] azithromycin (Zithromax Z-Migel) 250 MG tablet Take 2 tablets by mouth on day 1, then 1 tablet daily on days 2-5 6 tablet 0    [DISCONTINUED] fluticasone (FLONASE) 50 MCG/ACT nasal spray Administer 2 sprays into the nostril(s) as directed by provider Daily for 30 days. 16 g 0     No current facility-administered medications on file prior to visit.         HISTORY OF PRESENT ILLNESS     Leonie presents for annual health maintenance visit.    Well woman exam with ALONA Funez along with birth control surveillance and dysmenorrhea February 18, 2025.  Continued on Sprintec for dysmenorrhea.  Pap smear benign February 18, 2025.      History of seizure in 2019, no " reoccurrence, neurology has signed off, no longer on medication, last EEG unremarkable.     Anxiety previously well-controlled with Zoloft 50 mg.  She has had situational anxiety increases due to grandmother's passing.  Would like to increase the dose.    Taking compounded receptor med with no significant side effects.  Has lost considerable weight BMI down to 28.29.    Patient Care Team:  José Luis Dixon, SRINI, APRN as PCP - General (Internal Medicine)  Sarwat Cadena MD as Consulting Physician (Obstetrics and Gynecology)    General health: good  Lifestyle:  Attempting to lose weight?: Yes   Diet: eats a well balanced, healthy diet  Exercise: generally stays active (work/exercise)  Tobacco: Never used   Alcohol: occasional/infrequent  Work: Full-time  Reproductive health:  Sexually active?: Yes   Sexual problems?: No problems  Concern for STD?: No    Sees Gynecologist?: Yes   Ana Luisa/Postmenopausal?: No   Depression Screening:        PHQ-2 Depression Screening  Little interest or pleasure in doing things? Not at all   Feeling down, depressed, or hopeless? Not at all   PHQ-2 Total Score 0      PHQ-2: 0 (Not depressed)   PHQ-9: 0 (Negative screening for depression)    ALLERGIES  Allergies   Allergen Reactions    Amoxicillin Rash    Penicillins Rash        PFSH:     The following portions of the patient's history were reviewed and updated as appropriate: Allergies / Current Medications / Past Medical History / Surgical History / Social History / Family History    PROBLEM LIST   Patient Active Problem List   Diagnosis    Vitamin D deficiency    Seizure    Anxiety    Dehydration    Acute diarrhea    Neck pain    Dizziness       PAST MEDICAL HISTORY  Past Medical History:   Diagnosis Date    Anxiety 5/4/2021    COVID     Dysmenorrhea     PMS (premenstrual syndrome)     Seizure 04/19/2019    Varicella     When I was a baby       SURGICAL HISTORY  Past Surgical History:   Procedure Laterality Date    BREAST AUGMENTATION  08/2020     BREAST SURGERY  2020    Lift and reduction    EAR TUBES      EYE SURGERY      Lasik    REFRACTIVE SURGERY         SOCIAL HISTORY  Social History     Socioeconomic History    Marital status:     Number of children: 1   Tobacco Use    Smoking status: Never    Smokeless tobacco: Never   Vaping Use    Vaping status: Never Used   Substance and Sexual Activity    Alcohol use: Yes     Comment: occasionally    Drug use: Never    Sexual activity: Yes     Partners: Male     Birth control/protection: Birth control pill       FAMILY HISTORY  Family History   Problem Relation Age of Onset    No Known Problems Mother     Heart disease Father     Heart attack Father     Diabetes Father     Hypertension Father     Anxiety disorder Father     Early death Father          at 50 from heart attack    Heart disease Maternal Grandfather     Cancer Maternal Grandfather     Pancreatic cancer Maternal Grandfather     Hypertension Maternal Grandfather     Diabetes Maternal Grandfather     Brain cancer Maternal Aunt 60    Osteoporosis Maternal Grandmother     Anxiety disorder Paternal Grandmother     Cancer Maternal Aunt        IMMUNIZATION HISTORY  Immunization History   Administered Date(s) Administered    Fluzone  >6mos 2024    PPD Test 07/10/2018    Tdap 2023         REVIEW OF SYSTEMS     Review of Systems  Constitutional: Negative.    HENT: Negative.    Eyes: Negative.    Respiratory: Negative.    Cardiovascular: Negative.    Gastrointestinal: Negative.    Endocrine: Negative.    Genitourinary: Negative.    Musculoskeletal: Negative.    Skin: Negative.    Allergic/Immunologic: Negative.    Neurological: Negative.    Hematological: Negative.    Psychiatric/Behavioral: anxious    PHYSICAL EXAMINATION     Physical Exam  Constitutional:       Appearance: Normal appearance.   HENT:      Head: Normocephalic and atraumatic.      Right Ear: Tympanic membrane normal.      Left Ear: Tympanic membrane normal.      Nose:  Nose normal. No congestion.      Mouth/Throat:      Mouth: Mucous membranes are moist.      Pharynx: Oropharynx is clear.   Eyes:      Conjunctiva/sclera: Conjunctivae normal.      Pupils: Pupils are equal, round, and reactive to light.   Neck:      Thyroid: No thyromegaly.      Vascular: No carotid bruit.   Cardiovascular:      Rate and Rhythm: Normal rate and regular rhythm.      Pulses: Normal pulses.      Heart sounds: Normal heart sounds.   Pulmonary:      Effort: Pulmonary effort is normal.      Breath sounds: Normal breath sounds.   Abdominal:      General: There is no distension.      Palpations: Abdomen is soft.      Tenderness: There is no abdominal tenderness. There is no right CVA tenderness, left CVA tenderness or guarding.   Genitourinary:     Comments: Followed by OB/GYN  Musculoskeletal:         General: Normal range of motion.      Cervical back: Normal range of motion.      Right lower leg: No edema.      Left lower leg: No edema.   Lymphadenopathy:      Cervical: No cervical adenopathy.   Skin:     General: Skin is warm and dry.   Neurological:      Mental Status: She is alert and oriented to person, place, and time.      Cranial Nerves: No cranial nerve deficit.      Motor: No weakness.      Gait: Gait normal.      Deep Tendon Reflexes: Babinski sign absent on the right side.      Reflex Scores:       Patellar reflexes are 2+ on the right side and 2+ on the left side.  Psychiatric:         Mood and Affect: Mood normal.         Behavior: Behavior normal.         Thought Content: Thought content normal.         Judgment: Judgment normal.     REVIEWED DATA      Labs:    Lab Results   Component Value Date     11/17/2024    K 3.1 (L) 11/17/2024    CALCIUM 8.9 11/17/2024    AST 10 11/17/2024    ALT 10 11/17/2024    BUN 12 11/17/2024    CREATININE 0.82 11/17/2024    CREATININE 0.73 07/16/2024    CREATININE 0.70 07/14/2023    EGFRIFNONA 82 02/05/2021    EGFRIFAFRI 100 02/05/2021     Lab Results    Component Value Date    GLUCOSE 126 (H) 11/17/2024    HGBA1C 5.10 07/16/2024    HGBA1C 5.00 07/14/2023    HGBA1C 5.1 06/27/2022    TSH 1.150 07/16/2024    FREET4 0.99 07/16/2024     Lab Results   Component Value Date     (H) 07/16/2024    HDL 57 07/16/2024    TRIG 260 (H) 07/16/2024    CHOLHDLRATIO 3.95 07/16/2024     Lab Results   Component Value Date    ZRRN39JI 35.3 07/14/2023      Lab Results   Component Value Date    WBC 11.44 (H) 11/17/2024    HGB 13.4 11/17/2024    MCV 84.9 11/17/2024     11/17/2024     Lab Results   Component Value Date    PROTEIN Trace 07/16/2024    GLUCOSEU Negative 11/17/2024    BLOODU Negative 11/17/2024    NITRITEU Negative 11/17/2024    LEUKOCYTESUR Trace (A) 11/17/2024     Lab Results   Component Value Date    HEPCVIRUSABY <0.1 02/05/2021       Imaging:                Medical Tests:              Summary of old records / correspondence / consultant report:               Request outside records:         ASSESSMENT & PLAN     ANNUAL WELLNESS EXAM / PHYSICAL     Other medical problems addressed today:  Problem List Items Addressed This Visit       Vitamin D deficiency    Relevant Orders    Vitamin D,25-Hydroxy    Anxiety    Relevant Medications    sertraline (Zoloft) 50 MG tablet     Other Visit Diagnoses         Encounter for annual physical exam    -  Primary    Relevant Orders    Comprehensive Metabolic Panel    Lipid Panel With / Chol / HDL Ratio    CBC & Differential    Hemoglobin A1c    TSH+Free T4    UA / M With / Rflx Culture([facility] ONLY) - Urine, Clean Catch             Summary/Discussion:     Patient may continue competitors at provide as long as labs are stable and safe.  Increase Zoloft from 50 to 75 mg or 1-1/2 tablets daily for increased  situational anxiety triggers.  Monitor vitamin D and yearly labs today    Next Appointment with me: Visit date not found    Return in about 1 year (around 7/18/2026) for Annual physical with [facility] same day .      HEALTHCARE  MAINTENANCE ISSUES     Cancer Screening:  Colon: Initial/Next screening at age: 45  Repeat colon cancer screening: N/A at this time  Breast: Recommended monthly self exams; annual professional exam  Mammogram: N/A at this time  Cervical: pelvic exam as recommended by gyne  Skin: Monthly self skin examination, annual exam by health professional  Lung: Does not meet criteria for lung cancer screening.   Other:    Screening Labs & Tests:  Lab results reviewed & discussed with the patient or test orders placed today.  EKG:  CV Screening: Lipid panel  DEXA (65+ or postmenopausal with risk factors):   HEP C (If born [id number], or risk factors): Negative screen  Other:     Immunization/Vaccinations (to be given today unless deferred by patient)  Influenza: Patient had the flu shot this season  Hepatitis A: Declined by patient  Hepatitis B: Declined by patient  Tetanus/Pertussis: Up to date  Pneumococcal: Not needed at this time  Shingles: Not needed at this time  COVID: Does not plan to get the latest booster  RSV: Not indicated    Lifestyle Counseling:  Lifestyle Modifications: Improve dietary compliance and Increase intensity/regularity of aerobic exercise  Safety Issues: Always wear seatbelt, Avoid texting while driving   Use sunscreen, regular skin examination  Recommended annual dental/vision examination.  Emotional/Stress/Sleep: Reviewed and  given when appropriate      Health Maintenance   Topic Date Due    COVID-19 Vaccine (1 - 2024-25 season) 01/18/2026 (Originally 9/1/2024)    INFLUENZA VACCINE  10/01/2025    ANNUAL PHYSICAL  07/18/2026    PAP SMEAR  02/18/2028    TDAP/TD VACCINES (2 - Td or Tdap) 07/14/2033    HEPATITIS C SCREENING  Completed    Pneumococcal Vaccine 0-49  Aged Out

## 2025-07-23 LAB
25(OH)D3+25(OH)D2 SERPL-MCNC: 39.2 NG/ML (ref 30–100)
ALBUMIN SERPL-MCNC: 4.2 G/DL (ref 3.5–5.2)
ALBUMIN/GLOB SERPL: 1.3 G/DL
ALP SERPL-CCNC: 89 U/L (ref 39–117)
ALT SERPL-CCNC: 15 U/L (ref 1–33)
APPEARANCE UR: CLEAR
AST SERPL-CCNC: 15 U/L (ref 1–32)
BACTERIA #/AREA URNS HPF: ABNORMAL /[HPF]
BACTERIA UR CULT: ABNORMAL
BACTERIA UR CULT: ABNORMAL
BASOPHILS # BLD AUTO: 0.04 10*3/MM3 (ref 0–0.2)
BASOPHILS NFR BLD AUTO: 0.6 % (ref 0–1.5)
BILIRUB SERPL-MCNC: 0.3 MG/DL (ref 0–1.2)
BILIRUB UR QL STRIP: NEGATIVE
BUN SERPL-MCNC: 8 MG/DL (ref 6–20)
BUN/CREAT SERPL: 10.7 (ref 7–25)
CALCIUM SERPL-MCNC: 9.6 MG/DL (ref 8.6–10.5)
CASTS URNS QL MICRO: ABNORMAL /LPF
CHLORIDE SERPL-SCNC: 106 MMOL/L (ref 98–107)
CHOLEST SERPL-MCNC: 235 MG/DL (ref 0–200)
CHOLEST/HDLC SERPL: 4.2 {RATIO}
CO2 SERPL-SCNC: 25 MMOL/L (ref 22–29)
COLOR UR: YELLOW
CREAT SERPL-MCNC: 0.75 MG/DL (ref 0.57–1)
EGFRCR SERPLBLD CKD-EPI 2021: 106.6 ML/MIN/1.73
EOSINOPHIL # BLD AUTO: 0.06 10*3/MM3 (ref 0–0.4)
EOSINOPHIL NFR BLD AUTO: 1 % (ref 0.3–6.2)
EPI CELLS #/AREA URNS HPF: ABNORMAL /HPF (ref 0–10)
ERYTHROCYTE [DISTWIDTH] IN BLOOD BY AUTOMATED COUNT: 13.1 % (ref 12.3–15.4)
GLOBULIN SER CALC-MCNC: 3.3 GM/DL
GLUCOSE SERPL-MCNC: 78 MG/DL (ref 65–99)
GLUCOSE UR QL STRIP: NEGATIVE
HBA1C MFR BLD: 4.9 % (ref 4.8–5.6)
HCT VFR BLD AUTO: 43.5 % (ref 34–46.6)
HDLC SERPL-MCNC: 56 MG/DL (ref 40–60)
HGB BLD-MCNC: 13.8 G/DL (ref 12–15.9)
HGB UR QL STRIP: ABNORMAL
IMM GRANULOCYTES # BLD AUTO: 0.03 10*3/MM3 (ref 0–0.05)
IMM GRANULOCYTES NFR BLD AUTO: 0.5 % (ref 0–0.5)
KETONES UR QL STRIP: NEGATIVE
LDLC SERPL CALC-MCNC: 148 MG/DL (ref 0–100)
LEUKOCYTE ESTERASE UR QL STRIP: ABNORMAL
LYMPHOCYTES # BLD AUTO: 1.92 10*3/MM3 (ref 0.7–3.1)
LYMPHOCYTES NFR BLD AUTO: 30.7 % (ref 19.6–45.3)
MCH RBC QN AUTO: 27.5 PG (ref 26.6–33)
MCHC RBC AUTO-ENTMCNC: 31.7 G/DL (ref 31.5–35.7)
MCV RBC AUTO: 86.8 FL (ref 79–97)
MICRO URNS: ABNORMAL
MONOCYTES # BLD AUTO: 0.52 10*3/MM3 (ref 0.1–0.9)
MONOCYTES NFR BLD AUTO: 8.3 % (ref 5–12)
NEUTROPHILS # BLD AUTO: 3.68 10*3/MM3 (ref 1.7–7)
NEUTROPHILS NFR BLD AUTO: 58.9 % (ref 42.7–76)
NITRITE UR QL STRIP: POSITIVE
NRBC BLD AUTO-RTO: 0 /100 WBC (ref 0–0.2)
OTHER ANTIBIOTIC SUSC ISLT: ABNORMAL
PH UR STRIP: 5.5 [PH] (ref 5–7.5)
PLATELET # BLD AUTO: 341 10*3/MM3 (ref 140–450)
POTASSIUM SERPL-SCNC: 4.4 MMOL/L (ref 3.5–5.2)
PROT SERPL-MCNC: 7.5 G/DL (ref 6–8.5)
PROT UR QL STRIP: ABNORMAL
RBC # BLD AUTO: 5.01 10*6/MM3 (ref 3.77–5.28)
RBC #/AREA URNS HPF: ABNORMAL /HPF (ref 0–2)
SODIUM SERPL-SCNC: 140 MMOL/L (ref 136–145)
SP GR UR STRIP: 1.02 (ref 1–1.03)
T4 FREE SERPL-MCNC: 1.12 NG/DL (ref 0.92–1.68)
TRIGL SERPL-MCNC: 175 MG/DL (ref 0–150)
TSH SERPL DL<=0.005 MIU/L-ACNC: 1.54 UIU/ML (ref 0.27–4.2)
URINALYSIS REFLEX: ABNORMAL
UROBILINOGEN UR STRIP-MCNC: 0.2 MG/DL (ref 0.2–1)
VLDLC SERPL CALC-MCNC: 31 MG/DL (ref 5–40)
WBC # BLD AUTO: 6.25 10*3/MM3 (ref 3.4–10.8)
WBC #/AREA URNS HPF: ABNORMAL /HPF (ref 0–5)

## 2025-07-24 DIAGNOSIS — F41.9 ANXIETY: ICD-10-CM
